# Patient Record
Sex: FEMALE | Race: WHITE | NOT HISPANIC OR LATINO | Employment: UNEMPLOYED | URBAN - METROPOLITAN AREA
[De-identification: names, ages, dates, MRNs, and addresses within clinical notes are randomized per-mention and may not be internally consistent; named-entity substitution may affect disease eponyms.]

---

## 2018-01-02 ENCOUNTER — CONVERSION ENCOUNTER (OUTPATIENT)
Dept: MAMMOGRAPHY | Facility: HOSPITAL | Age: 48
End: 2018-01-02

## 2018-04-26 ENCOUNTER — OFFICE VISIT CONVERTED (OUTPATIENT)
Dept: FAMILY MEDICINE CLINIC | Facility: CLINIC | Age: 48
End: 2018-04-26
Attending: NURSE PRACTITIONER

## 2018-05-29 ENCOUNTER — OFFICE VISIT CONVERTED (OUTPATIENT)
Dept: FAMILY MEDICINE CLINIC | Facility: CLINIC | Age: 48
End: 2018-05-29
Attending: NURSE PRACTITIONER

## 2018-05-29 ENCOUNTER — CONVERSION ENCOUNTER (OUTPATIENT)
Dept: FAMILY MEDICINE CLINIC | Facility: CLINIC | Age: 48
End: 2018-05-29

## 2018-06-27 ENCOUNTER — CONVERSION ENCOUNTER (OUTPATIENT)
Dept: FAMILY MEDICINE CLINIC | Facility: CLINIC | Age: 48
End: 2018-06-27

## 2018-06-27 ENCOUNTER — OFFICE VISIT CONVERTED (OUTPATIENT)
Dept: FAMILY MEDICINE CLINIC | Facility: CLINIC | Age: 48
End: 2018-06-27
Attending: NURSE PRACTITIONER

## 2018-10-24 ENCOUNTER — OFFICE VISIT CONVERTED (OUTPATIENT)
Dept: FAMILY MEDICINE CLINIC | Facility: CLINIC | Age: 48
End: 2018-10-24
Attending: NURSE PRACTITIONER

## 2018-10-24 ENCOUNTER — CONVERSION ENCOUNTER (OUTPATIENT)
Dept: FAMILY MEDICINE CLINIC | Facility: CLINIC | Age: 48
End: 2018-10-24

## 2019-01-09 ENCOUNTER — HOSPITAL ENCOUNTER (OUTPATIENT)
Dept: OTHER | Facility: HOSPITAL | Age: 49
Discharge: HOME | End: 2019-01-09
Attending: NURSE PRACTITIONER

## 2019-02-04 ENCOUNTER — OFFICE VISIT CONVERTED (OUTPATIENT)
Dept: FAMILY MEDICINE CLINIC | Facility: CLINIC | Age: 49
End: 2019-02-04
Attending: FAMILY MEDICINE

## 2019-02-04 ENCOUNTER — HOSPITAL ENCOUNTER (OUTPATIENT)
Dept: FAMILY MEDICINE CLINIC | Facility: CLINIC | Age: 49
Discharge: HOME | End: 2019-02-04
Attending: FAMILY MEDICINE

## 2019-02-07 LAB — BACTERIA SPEC AEROBE CULT: NORMAL

## 2019-05-08 ENCOUNTER — HOSPITAL ENCOUNTER (OUTPATIENT)
Dept: FAMILY MEDICINE CLINIC | Facility: CLINIC | Age: 49
Discharge: HOME | End: 2019-05-08
Attending: NURSE PRACTITIONER

## 2019-05-08 ENCOUNTER — OFFICE VISIT CONVERTED (OUTPATIENT)
Dept: FAMILY MEDICINE CLINIC | Facility: CLINIC | Age: 49
End: 2019-05-08
Attending: NURSE PRACTITIONER

## 2019-05-08 LAB
25(OH)D3 SERPL-MCNC: 23.3 NG/ML (ref 30–100)
ALBUMIN SERPL-MCNC: 3.8 G/DL (ref 3.5–5)
ALBUMIN/GLOB SERPL: 1.2 {RATIO} (ref 1.4–2.6)
ALP SERPL-CCNC: 98 U/L (ref 42–98)
ALT SERPL-CCNC: 30 U/L (ref 10–40)
ANION GAP SERPL CALC-SCNC: 14 MMOL/L (ref 8–19)
ASO AB SERPL-ACNC: 37 [IU]/ML (ref 0–200)
AST SERPL-CCNC: 25 U/L (ref 15–50)
BILIRUB SERPL-MCNC: 0.24 MG/DL (ref 0.2–1.3)
BUN SERPL-MCNC: 13 MG/DL (ref 5–25)
BUN/CREAT SERPL: 14 {RATIO} (ref 6–20)
CALCIUM SERPL-MCNC: 9.4 MG/DL (ref 8.7–10.4)
CHLORIDE SERPL-SCNC: 97 MMOL/L (ref 99–111)
CONV CO2: 27 MMOL/L (ref 22–32)
CONV CREATININE URINE, RANDOM: 60.9 MG/DL (ref 10–300)
CONV MICROALBUM.,U,RANDOM: <12 MG/L (ref 0–20)
CONV TOTAL PROTEIN: 7 G/DL (ref 6.3–8.2)
CREAT UR-MCNC: 0.95 MG/DL (ref 0.5–0.9)
CRP SERPL HS-MCNC: 0.64 MG/DL (ref 0–0.5)
ERYTHROCYTE [SEDIMENTATION RATE] IN BLOOD: 14 MM/H (ref 0–20)
FOLATE SERPL-MCNC: 18.4 NG/ML (ref 4.8–20)
GFR SERPLBLD BASED ON 1.73 SQ M-ARVRAT: >60 ML/MIN/{1.73_M2}
GLOBULIN UR ELPH-MCNC: 3.2 G/DL (ref 2–3.5)
GLUCOSE SERPL-MCNC: 70 MG/DL (ref 65–99)
MICROALBUMIN/CREAT UR: 19.7 MG/G{CRE} (ref 0–35)
OSMOLALITY SERPL CALC.SUM OF ELEC: 279 MOSM/KG (ref 273–304)
POTASSIUM SERPL-SCNC: 3.3 MMOL/L (ref 3.5–5.3)
SODIUM SERPL-SCNC: 135 MMOL/L (ref 135–147)
URATE SERPL-MCNC: 4.3 MG/DL (ref 2.5–7.5)
VIT B12 SERPL-MCNC: 1504 PG/ML (ref 211–911)

## 2019-05-09 LAB
CHOLEST SERPL-MCNC: 141 MG/DL (ref 107–200)
CHOLEST/HDLC SERPL: 3.8 {RATIO} (ref 3–6)
CONV ANTI NUCLEAR ANTIBODY WITH REFLEX: NEGATIVE
HDLC SERPL-MCNC: 37 MG/DL (ref 40–60)
LDLC SERPL CALC-MCNC: 61 MG/DL (ref 70–100)
TRIGL SERPL-MCNC: 213 MG/DL (ref 40–150)
VLDLC SERPL-MCNC: 43 MG/DL (ref 5–37)

## 2019-07-12 ENCOUNTER — HOSPITAL ENCOUNTER (OUTPATIENT)
Dept: FAMILY MEDICINE CLINIC | Facility: CLINIC | Age: 49
Discharge: HOME | End: 2019-07-12
Attending: NURSE PRACTITIONER

## 2019-07-12 ENCOUNTER — OFFICE VISIT CONVERTED (OUTPATIENT)
Dept: FAMILY MEDICINE CLINIC | Facility: CLINIC | Age: 49
End: 2019-07-12
Attending: NURSE PRACTITIONER

## 2019-10-21 ENCOUNTER — HOSPITAL ENCOUNTER (OUTPATIENT)
Dept: FAMILY MEDICINE CLINIC | Facility: CLINIC | Age: 49
Discharge: HOME | End: 2019-10-21
Attending: NURSE PRACTITIONER

## 2019-10-21 ENCOUNTER — CONVERSION ENCOUNTER (OUTPATIENT)
Dept: FAMILY MEDICINE CLINIC | Facility: CLINIC | Age: 49
End: 2019-10-21

## 2019-10-21 ENCOUNTER — OFFICE VISIT CONVERTED (OUTPATIENT)
Dept: FAMILY MEDICINE CLINIC | Facility: CLINIC | Age: 49
End: 2019-10-21
Attending: NURSE PRACTITIONER

## 2019-10-21 LAB
ALBUMIN SERPL-MCNC: 4.2 G/DL (ref 3.5–5)
ALBUMIN/GLOB SERPL: 1.4 {RATIO} (ref 1.4–2.6)
ALP SERPL-CCNC: 80 U/L (ref 42–98)
ALT SERPL-CCNC: 21 U/L (ref 10–40)
ANION GAP SERPL CALC-SCNC: 20 MMOL/L (ref 8–19)
AST SERPL-CCNC: 22 U/L (ref 15–50)
BASOPHILS # BLD AUTO: 0.05 10*3/UL (ref 0–0.2)
BASOPHILS NFR BLD AUTO: 1.4 % (ref 0–3)
BILIRUB SERPL-MCNC: 0.21 MG/DL (ref 0.2–1.3)
BUN SERPL-MCNC: 19 MG/DL (ref 5–25)
BUN/CREAT SERPL: 17 {RATIO} (ref 6–20)
CALCIUM SERPL-MCNC: 9.4 MG/DL (ref 8.7–10.4)
CHLORIDE SERPL-SCNC: 99 MMOL/L (ref 99–111)
CHOLEST SERPL-MCNC: 159 MG/DL (ref 107–200)
CHOLEST/HDLC SERPL: 4.3 {RATIO} (ref 3–6)
CONV ABS IMM GRAN: 0.02 10*3/UL (ref 0–0.2)
CONV CO2: 23 MMOL/L (ref 22–32)
CONV IMMATURE GRAN: 0.6 % (ref 0–1.8)
CONV TOTAL PROTEIN: 7.2 G/DL (ref 6.3–8.2)
CREAT UR-MCNC: 1.09 MG/DL (ref 0.5–0.9)
DEPRECATED RDW RBC AUTO: 42.7 FL (ref 36.4–46.3)
EOSINOPHIL # BLD AUTO: 0.13 10*3/UL (ref 0–0.7)
EOSINOPHIL # BLD AUTO: 3.6 % (ref 0–7)
ERYTHROCYTE [DISTWIDTH] IN BLOOD BY AUTOMATED COUNT: 13 % (ref 11.7–14.4)
GFR SERPLBLD BASED ON 1.73 SQ M-ARVRAT: 60 ML/MIN/{1.73_M2}
GLOBULIN UR ELPH-MCNC: 3 G/DL (ref 2–3.5)
GLUCOSE SERPL-MCNC: 87 MG/DL (ref 65–99)
HCT VFR BLD AUTO: 44.6 % (ref 37–47)
HDLC SERPL-MCNC: 37 MG/DL (ref 40–60)
HGB BLD-MCNC: 15 G/DL (ref 12–16)
LDLC SERPL CALC-MCNC: 79 MG/DL (ref 70–100)
LYMPHOCYTES # BLD AUTO: 1.27 10*3/UL (ref 1–5)
LYMPHOCYTES NFR BLD AUTO: 35.3 % (ref 20–45)
MCH RBC QN AUTO: 30.3 PG (ref 27–31)
MCHC RBC AUTO-ENTMCNC: 33.6 G/DL (ref 33–37)
MCV RBC AUTO: 90.1 FL (ref 81–99)
MONOCYTES # BLD AUTO: 0.24 10*3/UL (ref 0.2–1.2)
MONOCYTES NFR BLD AUTO: 6.7 % (ref 3–10)
NEUTROPHILS # BLD AUTO: 1.89 10*3/UL (ref 2–8)
NEUTROPHILS NFR BLD AUTO: 52.4 % (ref 30–85)
NRBC CBCN: 0 % (ref 0–0.7)
OSMOLALITY SERPL CALC.SUM OF ELEC: 288 MOSM/KG (ref 273–304)
PLATELET # BLD AUTO: 209 10*3/UL (ref 130–400)
PMV BLD AUTO: 11.4 FL (ref 9.4–12.3)
POTASSIUM SERPL-SCNC: 3.7 MMOL/L (ref 3.5–5.3)
RBC # BLD AUTO: 4.95 10*6/UL (ref 4.2–5.4)
SODIUM SERPL-SCNC: 138 MMOL/L (ref 135–147)
TRIGL SERPL-MCNC: 214 MG/DL (ref 40–150)
VLDLC SERPL-MCNC: 43 MG/DL (ref 5–37)
WBC # BLD AUTO: 3.6 10*3/UL (ref 4.8–10.8)

## 2019-10-22 LAB
ASO AB SERPL-ACNC: 43 [IU]/ML (ref 0–200)
CONV RHEUMATOID FACTOR IGM: <10 [IU]/ML (ref 0–14)
CRP SERPL-MCNC: 2.4 MG/L (ref 0–5)
DSDNA AB SER-ACNC: NEGATIVE [IU]/ML
ENA AB SER IA-ACNC: NEGATIVE {RATIO}
URATE SERPL-MCNC: 4 MG/DL (ref 2.5–7.5)

## 2019-11-11 ENCOUNTER — OFFICE VISIT CONVERTED (OUTPATIENT)
Dept: ORTHOPEDIC SURGERY | Facility: CLINIC | Age: 49
End: 2019-11-11
Attending: ORTHOPAEDIC SURGERY

## 2019-11-11 ENCOUNTER — CONVERSION ENCOUNTER (OUTPATIENT)
Dept: ORTHOPEDIC SURGERY | Facility: CLINIC | Age: 49
End: 2019-11-11

## 2020-06-03 ENCOUNTER — OFFICE VISIT CONVERTED (OUTPATIENT)
Dept: ORTHOPEDIC SURGERY | Facility: CLINIC | Age: 50
End: 2020-06-03
Attending: PHYSICIAN ASSISTANT

## 2021-01-13 ENCOUNTER — OFFICE VISIT CONVERTED (OUTPATIENT)
Dept: ORTHOPEDIC SURGERY | Facility: CLINIC | Age: 51
End: 2021-01-13
Attending: PHYSICIAN ASSISTANT

## 2021-02-03 ENCOUNTER — OFFICE VISIT CONVERTED (OUTPATIENT)
Dept: ORTHOPEDIC SURGERY | Facility: CLINIC | Age: 51
End: 2021-02-03
Attending: PHYSICIAN ASSISTANT

## 2021-05-07 NOTE — PROGRESS NOTES
Progress Note      Patient Name: Nani Shultz   Patient ID: [id number]   Sex: Female   YOB: 1970        Visit Date: April 26, 2018    Provider: CHIARA White   Location: [facility]   Location Address: [address] [place] [address]  [id number]   Location Phone: [phone]          Chief Complaint     PATIENT IS HERE FRO HIGH BLOOD PRESSURE, SHE HAS HAD ABLATION DONE AND SHE GOT STUNG ON HER LEFT BEFORE ARM ABOUT A WEEK A GO AND IT IS STILL NOT HEALED UP YET.  SHE HAS BEEN A LITTLE UNHAPPY WITH OUR FRONT OFFICE AS FAR AS TRYING TO GET IN TO SEE SOMEONE.  I AM GOING TO LET EDITH SPEAK WITH HER.  SHE DID GET HER FLU SHOT, SHE HAD MAMMOGRAM NOT TO LONG A GO, SHE JUST HAD A PAP SMEAR.       History Of Present Illness  Nani Shultz is a 47 year old /White female who presents for evaluation and treatment of:      PATIENT IS HERE For HIGH BLOOD PRESSURE, and s/s of the dizziness and ringing of ear right side and fatigue and then HA's--the HA x 2 days --BP been elevated at the /90 on Monday then today 144/84--then states sister, mother, and GM all have HTN--pt states at least couple months now--BP been elevated and states feels swollen     SHE HAS HAD ABLATION DONE--2 weeks ago--4/13/18--and F/U with DR Shepard--     AND SHE GOT STUNG by a wasp--happened last Tuesday and the whole arm was covered in whelps and rash and hives--and itchy--did cold compresses and then witch hazel and then took benadryl and then also some sort of breathing --like fullness and bloated feeling--and pt didn't know if was from the surgery or the wasp sting--took 5 days for it to go away-and still bruised and a scab still today--all ON HER LEFT BEFORE ARM ABOUT A WEEK A GO AND IT IS STILL NOT HEALED UP YET.     just had Tdap 2 yrs ago when grandbaby was born     and wt gain--and over the last 4 yrs and was always size 4 all her life until now--and slow wt gain--average wt  120# and then states never weighed this wt --and all in her gut and butt--and cut out pepsi's and then replaces with water with crystal light lemonade and then salads--doing this for months and exercise bike 3 miles 3 times weekly--and pt never lost anything--and states all her sisters are big but she's never been big--not eating sweets--her sisters have thyroid DO--    SHE DID GET HER FLU SHOT,     SHE HAD MAMMOGRAM NOT TO LONG AGO,     SHE JUST HAD A PAP SMEAR.    and refills and labs       Past Medical History  Disease Name Date Onset Notes   Anxiety --  --    CVA (cerebral vascular accident); history --  --    Heart attack --  --    Hyperlipidemia --  --    Hypertension, Benign Essential --  --    Seasonal allergies --  --          Past Surgical History  Procedure Name Date Notes   Tubal ligation --  --          Medication List  Name Date Started Instructions   baclofen 10 mg oral tablet  --    fluticasone propionate (bulk) 100 % miscellaneous powder  use as directed   levocetirizine 5 mg oral tablet  --    Lumigan 0.01 % ophthalmic (eye) drops  --    meloxicam 15 mg oral tablet  take 1 tablet (15 mg) by oral route once daily   ondansetron 4 mg oral tablet,disintegrating  --    pravastatin 40 mg oral tablet  --          Allergy List  Allergen Name Date Reaction Notes   Codeine Sulfate --  --  --          Family Medical History  Disease Name Relative/Age Notes   Alcohol Abuse / Father    Father/    Arthrtis / Mother    Mother/    Chronic Obstructive Pulmonary Disease / Grandmother (maternal)    Grandmother (maternal)/    Father, Grandfather, or Brother developed Heart Disease before the age of 65 / --    Hyperlipidemia / Father; Grandmother (paternal); Brother; Sister    Brother/     Father/     Grandmother (paternal)/     Sister/    Hypertension / Mother; Grandmother (maternal); Grandfather (maternal); Grandmother (paternal); Brother; Sister    Brother/     Grandfather (maternal)/     Grandmother (maternal)/      "Grandmother (paternal)/     Mother/     Sister/    Mother, Grandmother, or Sister developed Heart Disease before the age of 65 / --    Osteoporosis / Mother; Grandmother (maternal)    Grandmother (maternal)/     Mother/          Social History  Finding Status Start/Stop Quantity Notes   Alcohol Never --/-- --  never drinks alcohol   Exercises regularly --  --/-- --  3-4 times per week   Recreational Drug Use Never --/-- --  never used   Second hand smoke exposure Unknown --/-- --  no   Tobacco Former --/-- --  former smoker, for 15 years, currently uses other tobacco products   Uses seatbelts --  --/-- --  yes         Review of Systems  · Constitutional  o Admits  o : fatigue, headache, weight gain  o Denies  o : fever  · HENT  o Admits  o : vertigo  · Cardiovascular  o Admits  o : lightheadedness  o Denies  o : chest pain, irregular heart beats, rapid heart rate, lower extremity edema  · Respiratory  o Denies  o : shortness of breath, productive cough  · Gastrointestinal  o Admits  o : nausea  o Denies  o : vomiting  · Genitourinary  o Denies  o : dysuria, urinary retention  · Integument  o Admits  o : rash, itching, additional integumentary symptoms except as noted in the HPI  · Neurologic  o Admits  o : headaches, dizziness  · Musculoskeletal  o Denies  o : joint swelling, limitation of motion  · Endocrine  o Denies  o : cold intolerance, heat intolerance  · Psychiatric  o Denies  o : anxiety, depression, suicidal ideation, homicidal ideation  · Heme-Lymph  o Denies  o : petechiae, lymph node enlargement or tenderness  · Allergic-Immunologic  o Denies  o : frequent illnesses      Vitals  Date Time BP Position Site L\R Cuff Size HR RR TEMP(F) WT  HT  BMI kg/m2 BSA m2 O2 Sat HC       04/26/2018 12:53 /84 Sitting    110 - R  98 156lbs 3oz 5'  3\" 27.67 1.77 98 %           Physical Examination  · Constitutional  o Appearance  o : well developed, well-nourished, in no acute distress--BMI 27  · Head and " Face  o HEENT  o : Unremarkable  · Eyes  o Conjunctivae  o : conjunctivae normal  · Neck  o Inspection/Palpation  o : supple  o Thyroid  o : no thyromegaly  · Respiratory  o Respiratory Effort  o : breathing unlabored  o Auscultation of Lungs  o : clear to ascultation  · Cardiovascular  o Heart  o :   § Auscultation of Heart  § : regular rate and rhythm  o Peripheral Vascular System  o :   § Extremities  § : no pitting edema  · Gastrointestinal  o Abdominal Examination  o :   § Abdomen  § : soft nontender small puncture site areas well approximated.   · Lymphatic  o Neck  o : no lymphadenopathy present  · Musculoskeletal  o General  o :   § General Musculoskeletal  § : No joint swelling or deformity., Muscle tone, strength, and development grossly normal.  · Skin and Subcutaneous Tissue  o General Inspection  o : skin turgor normal, texture normal--the left inner FA with a scabbed area from the wasp sting and then bruising around the area--  · Neurologic  o Gait and Station  o :   § Gait Screening  § : normal gait  · Psychiatric  o Mood and Affect  o : mood normal, affect appropriate              Assessment  · Allergic rhinitis due to allergen     477.9/J30.9  · Fatigue     780.79/R53.83  · Hyperlipidemia     272.4/E78.5  · Hypertension     401.9/I10  · Wasp sting       Toxic effect of venom of wasps, accidental (unintentional), initial encounter     989.5/T63.461A  · Allergic reaction to insect sting       Toxic effect of venom of other arthropod, accidental (unintentional), initial encounter     989.5/T63.481A      Plan  · Orders  o B12 Folate levels (B12FO) - 780.79/R53.83 - 04/26/2018  o CBC with Auto Diff [facility] (26230) - 401.9/I10, 989.5/T63.461A - 04/26/2018  o CMP [facility] (78710) - 401.9/I10 - 04/26/2018  o Lipid Panel [facility] (54320) - 272.4/E78.5 - 04/26/2018  o Thyroid Profile (THYII) - 401.9/I10 - 04/26/2018  o ACO-39: Current medications updated and reviewed () - - 04/26/2018  o ACO-18: Positive screen for  clinical depression using a standardized tool and a follow-up plan documented () - - 04/26/2018  o Influenza immunization was ordered or administered () - - 04/26/2018  o EKG (87759) - 401.9/I10 - 04/26/2018  · Medications  o Flonase Allergy Relief 50 mcg/actuation nasal spray,suspension   SIG: spray 1 - 2 sprays (50 - 100 mcg) in each nostril by intranasal route once daily as needed   DISP: (1) 9.9 ml aer w/adap with 5 refills  Prescribed on 04/26/2018     o EpiPen 2-Pritesh 0.3 mg/0.3 mL injection auto-injector   SIG: inject 0.3 milliliter (0.3 mg) by intramuscular route once as needed for anaphylaxis and go to ER dept if used   DISP: (2) 2 ct syringes with 1 refills  Prescribed on 04/26/2018     o hydrochlorothiazide 12.5 mg oral tablet   SIG: take 1 tablet (12.5 mg) by oral route once daily   DISP: (30) tablets with 1 refills  Prescribed on 04/26/2018     o levocetirizine 5 mg oral tablet   SIG: take 1 tablet (5 mg) by oral route once daily in the evening   DISP: (30) tablet with 5 refills  Adjusted on 04/26/2018     o meloxicam 15 mg oral tablet   SIG: take 1 tablet (15 mg) by oral route once daily   DISP: (30) tablet with 5 refills  Adjusted on 04/26/2018     o pravastatin 40 mg oral tablet   SIG: take 1 tablet (40 mg) by oral route once daily at bedtime   DISP: (30) tablet with 5 refills  Adjusted on 04/26/2018     o baclofen 10 mg oral tablet   SIG: ---   DISP: (0) tablet with 0 refills  Discontinued on 04/26/2018     o fluticasone propionate (bulk) 100 % miscellaneous powder   SIG: use as directed   DISP: (1) 0.5 gm jar with 0 refills  Discontinued on 04/26/2018     o ondansetron 4 mg oral tablet,disintegrating   SIG: ---   DISP: (0) tablet with 0 refills  Discontinued on 04/26/2018     · Instructions  o Recommended exercise program to assist with cholesterol, weight loss and overall health improvement.  o Advised that cheeses and other sources of dairy fats, animal fats, fast food, and the extras  (candy, pasteries, pies, doughnuts and cookies) all contain LDL raising nutrients. Advised to increase fruits, vegetables, whole grains, and to monitor portion sizes.   o Take all medications as prescribed/directed.  o Patient was educated/instructed on their diagnosis, treatment and medications prior to discharge from the clinic today.  o Patient instructed to seek medical attention urgently for new or worsening symptoms.  o Call the office with any concerns or questions.  o Chronic conditions reviewed and taken into consideration for today's treatment plan.  · Disposition  o Call or Return if symptoms worsen or persist.  o Return Visit Request in/on 1 month +/- 2 days (5810).            Electronically Signed by: CHIARA White -Author on April 26, 2018 11:35:19 PM

## 2021-05-07 NOTE — PROGRESS NOTES
Progress Note      Patient Name: Nani Shultz   Patient ID: [id number]   Sex: Female   YOB: 1970        Visit Date: July 12, 2019    Provider: RODOLFO Braswell   Location: [facility]   Location Address: [address] [place] [address] [place]  [id number]   Location Phone: [phone]          Chief Complaint     right elbow pain, 2-3 weeks, getting worse.       History Of Present Illness  Nani Shultz is a 48 year old /White female who presents for evaluation and treatment of:      right elbow pain x 2-3 weeks and worsening - pain is worse now that it has been - last night she kept waking with shooting pain - she went to Urgent Care and was dx with tennis elbow 2.5 weeks and was given Naproxen, with very little improvement - using Biofreeze and a medicated patch with very little relief - she was painting a ceiling originally and pain started - having some numbness of the hand that started today and having some swelling of the right hand       Past Medical History  Disease Name Date Onset Notes   Anxiety --  --    CVA (cerebral vascular accident); history --  --    Heart attack --  --    Hyperlipidemia --  --    Hypertension, Benign Essential --  --    Seasonal allergies --  --          Past Surgical History  Procedure Name Date Notes   Tubal ligation --  --          Medication List  Name Date Started Instructions   EpiPen 2-Pritesh 0.3 mg/0.3 mL injection auto-injector 05/08/2019 inject 0.3 milliliter (0.3 mg) by intramuscular route once as needed for anaphylaxis and go to ER dept if used   Flonase Allergy Relief 50 mcg/actuation nasal spray,suspension 05/08/2019 spray 1 - 2 sprays (50 - 100 mcg) in each nostril by intranasal route once daily as needed for 30 days   hydrochlorothiazide 25 mg oral tablet 05/08/2019 take 1 tablet (25 mg) by oral route once daily for 30 days   levocetirizine 5 mg oral tablet 05/08/2019 take 1 tablet (5 mg) by oral route once daily in the  evening for 30 days   Lumigan 0.01 % ophthalmic (eye) drops  --    meloxicam 15 mg oral tablet 05/08/2019 take 1 tablet (15 mg) by oral route once daily for 30 days   naproxen 500 mg oral tablet  take 1 tablet (500 mg) by oral route 2 times per day with food   pravastatin 40 mg oral tablet 05/08/2019 take 1 tablet (40 mg) by oral route once daily at bedtime   Vitamin D2 50,000 unit oral capsule 06/24/2019 take 1 capsule (50,000 unit) by oral route once weekly for 30 days   Wellbutrin  mg oral tablet extended release 24 hr 05/08/2019 take 3 tablets by oral route daily         Allergy List  Allergen Name Date Reaction Notes   Codeine Sulfate --  --  --          Family Medical History  Disease Name Relative/Age Notes   Chronic Obstructive Pulmonary Disease Grandmother (maternal)/   Grandmother (maternal)   Hyperlipidemia Brother/  Father/  Grandmother (paternal)/  Sister/   Father; Grandmother (paternal); Brother; Sister   Hypertension Brother/  Grandfather (maternal)/  Grandmother (maternal)/  Grandmother (paternal)/  Mother/  Sister/   Mother; Grandmother (maternal); Grandfather (maternal); Grandmother (paternal); Brother; Sister   Arthrtis Mother/   Mother   Osteoporosis Grandmother (maternal)/  Mother/   Mother; Grandmother (maternal)   Alcohol Abuse Father/   Father   Mother, Grandmother, or Sister developed Heart Disease before the age of 65  --    Father, Grandfather, or Brother developed Heart Disease before the age of 65  --          Social History  Finding Status Start/Stop Quantity Notes   Alcohol Never --/-- --  never drinks alcohol   Exercises regularly --  --/-- --  3-4 times per week   Recreational Drug Use Never --/-- --  never used   Second hand smoke exposure Unknown --/-- --  no   Tobacco Former --/-- --  former smoker, for 15 years, currently uses other tobacco products   Uses seatbelts --  --/-- --  yes         Immunizations  NameDate Admin Mfg Trade Name Lot Number Route Inj VIS Given VIS  Publication   HepA10/29/2018 NE HAVRIX-ADULT  NE NE 10/30/2018    Comments:          Review of Systems  · Constitutional  o Admits  o : headache  o Denies  o : fever, chills, body aches  · Cardiovascular  o Denies  o : chest pain, palpitations  · Respiratory  o Denies  o : shortness of breath  · Musculoskeletal  o * See HPI      Vitals  Date Time BP Position Site L\R Cuff Size HR RR TEMP (F) WT  HT  BMI kg/m2 BSA m2 O2 Sat HC       07/12/2019 01:50 /88 Sitting    86 - R  97 151lbs 2oz    98 %          Physical Examination  · Constitutional  o Appearance  o : well developed, well-nourished, in no acute distress  · Eyes  o Conjunctivae  o : conjunctivae normal  o Pupils and Irises  o : pupils equal and round, pupils reactive to light bilaterally  · Neck  o Inspection/Palpation  o : supple  o Thyroid  o : no thyromegaly  · Respiratory  o Respiratory Effort  o : breathing unlabored  o Auscultation of Lungs  o : clear to ascultation  · Cardiovascular  o Heart  o :   § Auscultation of Heart  § : regular rate and rhythm  o Peripheral Vascular System  o :   § Extremities  § : no edema  · Lymphatic  o Neck  o : no lymphadenopathy present  · Musculoskeletal  o General  o :   § General Musculoskeletal  § : tenderness of the right lateral epicondyle with palpation and internal and external rotation of right arm. No joint swelling or deformity. Muscle tone, strength, and development grossly normal.  · Skin and Subcutaneous Tissue  o General Inspection  o : NL tone  · Neurologic  o Gait and Station  o :   § Gait Screening  § : normal gait  · Psychiatric  o Mood and Affect  o : mood normal, affect appropriate              Assessment  · Lateral epicondylitis (tennis elbow)     726.32/M77.10  · Elbow pain, right     719.42/M25.521      Plan  · Orders  o ACO-39: Current medications updated and reviewed () - - 07/12/2019  o Elbow (Right) 3v [facility] (13413-KA) - 719.42/M25.521 -  07/12/2019  · Medications  o prednisone 5 mg oral tablets,dose pack   SIG: take as directed for 6 days   DISP: (1) 21 ct dose-pack with 0 refills  Prescribed on 07/12/2019     · Instructions  o Take all medications as prescribed/directed.  o Patient was educated/instructed on their diagnosis, treatment and medications prior to discharge from the clinic today.  o May take Tylenol and use ice to the elbow - may continue to wear brace.   · Disposition  o Follow up as needed.            Electronically Signed by: RODOLFO Braswell -Author on July 12, 2019 02:50:50 PM
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
## 2021-05-07 NOTE — PROGRESS NOTES
Progress Note      Patient Name: Nani Shultz   Patient ID: [id number]   Sex: Female   YOB: 1970        Visit Date: October 21, 2019    Provider: RODOLFO Braswell   Location: [facility]   Location Address: [address] [place] [address] [place]  [address]   Location Phone: [phone]          Chief Complaint     employee wellness and refills, patient is fasting, and the meloxicam is not working       History Of Present Illness  Nani Shultz is a 48 year old /White female who presents for evaluation and treatment of:      employee wellness with fasting labs    mammogram: last Jan 2019: NL result    Hx of fallopian removal and ablation 1.5years ago - last pap smear 2/2019 by Dr. Dillard     stays home - she previously ran/worked in restaurants and bars and states that she can no longer due to pain in her wrists and elbows - arthritis medication is no longer helping at all, continues to have swelling and increased aching - picking some stuff up will send a shooting pain in her left wrist and elbow and will have to catch with her right hand    refills of medications for     HTN: stable in office -     Hyperlipidemia: no SE of the medication    Arthritis: see above, medication no longer helping    Anxiety: symptoms are very well controlled with Wellbutrin - no SE of medication    Meloxicam isn't helping with tennis elbow       Past Medical History  Disease Name Date Onset Notes   Anxiety --  --    Essential hypertension 10/21/2019 --    Heat stroke --  --    Hyperlipidemia --  --    Hypertension, Benign Essential --  --    Seasonal allergies --  --          Past Surgical History  Procedure Name Date Notes   Tubal ligation --  --          Medication List  Name Date Started Instructions   EpiPen 2-Pritesh 0.3 mg/0.3 mL injection auto-injector 05/08/2019 inject 0.3 milliliter (0.3 mg) by intramuscular route once as needed for anaphylaxis and go to ER dept if used   Flonase  Allergy Relief 50 mcg/actuation nasal spray,suspension 10/21/2019 spray 1 - 2 sprays (50 - 100 mcg) in each nostril by intranasal route once daily as needed for 30 days   hydrochlorothiazide 25 mg oral tablet 10/21/2019 take 1 tablet (25 mg) by oral route once daily for 30 days   levocetirizine 5 mg oral tablet 10/21/2019 take 1 tablet (5 mg) by oral route once daily in the evening for 30 days   Lumigan 0.01 % ophthalmic (eye) drops  --    naproxen 500 mg oral tablet  take 1 tablet (500 mg) by oral route 2 times per day with food   pravastatin 40 mg oral tablet 10/21/2019 take 1 tablet (40 mg) by oral route once daily at bedtime   Vitamin D2 50,000 unit oral capsule 06/24/2019 take 1 capsule (50,000 unit) by oral route once weekly for 30 days   Wellbutrin  mg oral tablet extended release 24 hr 10/21/2019 take 3 tablets by oral route daily         Allergy List  Allergen Name Date Reaction Notes   Codeine Sulfate --  --  --          Family Medical History  Disease Name Relative/Age Notes   Chronic Obstructive Pulmonary Disease Grandmother (maternal)/   Grandmother (maternal)   Hyperlipidemia Brother/  Father/  Grandmother (paternal)/  Sister/   Father; Grandmother (paternal); Brother; Sister   Hypertension Brother/  Grandfather (maternal)/  Grandmother (maternal)/  Grandmother (paternal)/  Mother/  Sister/   Mother; Grandmother (maternal); Grandfather (maternal); Grandmother (paternal); Brother; Sister   Arthrtis Mother/   Mother   Osteoporosis Grandmother (maternal)/  Mother/   Mother; Grandmother (maternal)   Alcohol Abuse Father/   Father   Mother, Grandmother, or Sister developed Heart Disease before the age of 65  --    Father, Grandfather, or Brother developed Heart Disease before the age of 65  --          Social History  Finding Status Start/Stop Quantity Notes   Alcohol Never --/-- --  never drinks alcohol   Exercises regularly --  --/-- --  3-4 times per week   Recreational Drug Use Never --/-- --   "never used   Second hand smoke exposure Unknown --/-- --  no   Tobacco Former --/-- --  former smoker, for 15 years, currently uses other tobacco products   Uses seatbelts --  --/-- --  yes         Immunizations  NameDate Admin Mfg Trade Name Lot Number Route Inj VIS Given VIS Publication   HepA10/29/2018 NE HAVRIX-ADULT  NE NE 10/30/2018    Comments:          Review of Systems  · Constitutional  o Denies  o : headache  · Cardiovascular  o Denies  o : chest pain, shortness of breath, palpitations  · Respiratory  o Admits  o : cough  o Denies  o : shortness of breath, wheezing  · Allergic-Immunologic  o Admits  o : sinus allergy symptoms      Vitals  Date Time BP Position Site L\R Cuff Size HR RR TEMP (F) WT  HT  BMI kg/m2 BSA m2 O2 Sat HC       10/21/2019 08:19 /72 Sitting    77 - R  97.4 145lbs 2oz 5'  3\" 25.71 1.71 97 %          Physical Examination  · Constitutional  o Appearance  o : well developed, well-nourished, in no acute distress  · Head and Face  o HEENT  o : Unremarkable  · Eyes  o Conjunctivae  o : conjunctivae normal  o Pupils and Irises  o : pupils equal and round, pupils reactive to light bilaterally  · Neck  o Inspection/Palpation  o : supple  o Thyroid  o : no thyromegaly  · Respiratory  o Respiratory Effort  o : breathing unlabored  o Auscultation of Lungs  o : clear to ascultation  · Cardiovascular  o Heart  o :   § Auscultation of Heart  § : regular rate and rhythm  o Peripheral Vascular System  o :   § Extremities  § : no edema  · Lymphatic  o Neck  o : no lymphadenopathy present  · Musculoskeletal  o General  o :   § General Musculoskeletal  § : No joint swelling or deformity. Muscle tone, strength, and development grossly normal.  · Skin and Subcutaneous Tissue  o General Inspection  o : NL tone  · Neurologic  o Gait and Station  o :   § Gait Screening  § : normal gait  · Psychiatric  o Mood and Affect  o : mood normal, affect appropriate              Assessment  · Annual physical " exam     V70.0/Z00.00  · Essential hypertension     401.9/I10  · Hyperlipidemia     272.4/E78.5  · Polyarthralgia     719.49/M25.50  · Vitamin D deficiency     268.9/E55.9  · Anxiety     300.02/F41.1  · Lateral epicondylitis of both elbows       Lateral epicondylitis, right elbow     726.32/M77.11  Lateral epicondylitis, left elbow     726.32/M77.12    Problems Reconciled  Plan  · Orders  o CBC with Auto Diff [facility] ([id number]) - 401.9/I10 - 10/21/2019  o CMP [facility] (15288) - 401.9/I10 - 10/21/2019  o Lipid Panel [facility] (58936) - 272.4/E78.5 - 10/21/2019  o RA Profile 1 (Uric Acid, ASO, RF IgM, CORRINA, CRP) [facility] ([id number], [id number], 65156, 88256, 33177) - 719.49/M25.50 - 10/21/2019  o Vitamin D Level (18968) - 268.9/E55.9 - 10/21/2019  o Urinalysis with Reflex Microscopy if abnormal ([facility]) (74255) - 401.9/I10 - 10/21/2019  o ACO-39: Current medications updated and reviewed () - - 10/21/2019  o ORTHOPEDIC CONSULTATION (ORTHO) - 719.49/M25.50 - 10/21/2019  · Medications  o diclofenac sodium 75 mg oral tablet,delayed release (DR/EC)   SIG: take 1 tablet (75 mg) by oral route 2 times per day for 30 days   DISP: (60) tablets with 5 refills  Prescribed on 10/21/2019     o Flonase Allergy Relief 50 mcg/actuation nasal spray,suspension   SIG: spray 1 - 2 sprays (50 - 100 mcg) in each nostril by intranasal route once daily as needed for 30 days   DISP: (1) 9.9 ml aer w/adap with 5 refills  Adjusted on 10/21/2019     o hydrochlorothiazide 25 mg oral tablet   SIG: take 1 tablet (25 mg) by oral route once daily for 30 days   DISP: (30) tablets with 5 refills  Adjusted on 10/21/2019     o levocetirizine 5 mg oral tablet   SIG: take 1 tablet (5 mg) by oral route once daily in the evening for 30 days   DISP: (30) tablet with 5 refills  Adjusted on 10/21/2019     o pravastatin 40 mg oral tablet   SIG: take 1 tablet (40 mg) by oral route once daily at bedtime   DISP: (30) Tablet with 5 refills  Adjusted on 10/21/2019     o Wellbutrin  mg oral  tablet extended release 24 hr   SIG: take 3 tablets by oral route daily   DISP: (90) tablets with 5 refills  Adjusted on 10/21/2019     o meloxicam 15 mg oral tablet   SIG: take 1 tablet (15 mg) by oral route once daily for 30 days   DISP: (30) tablet with 5 refills  Discontinued on 10/21/2019     o Medications have been Reconciled  o Transition of Care or Provider Policy  · Instructions  o Reviewed health maintenance flowsheet and updated information. Orders were placed and/or patient's response was documented.  o Advised that cheeses and other sources of dairy fats, animal fats, fast food, and the extras (candy, pasteries, pies, doughnuts and cookies) all contain LDL raising nutrients. Advised to increase fruits, vegetables, whole grains, and to monitor portion sizes.   o Take all medications as prescribed/directed.  o Patient was educated/instructed on their diagnosis, treatment and medications prior to discharge from the clinic today.  · Disposition  o Follow up as needed.            Electronically Signed by: RODOLFO Braswell -Author on October 24, 2019 08:25:50 AM

## 2021-05-07 NOTE — PROGRESS NOTES
Progress Note      Patient Name: Nani Shultz   Patient ID: [id number]   Sex: Female   YOB: 1970        Visit Date: February 4, 2019    Provider: Mamadou Ignacio MD   Location: [facility]   Location Address: [address] [place] [address]  [id number]   Location Phone: [phone]          Chief Complaint     sore throat and dry cough x 2 days, grandkids       History Of Present Illness  Nani Shultz is a 48 year old /White female who presents for evaluation and treatment of:      cold symptoms x 2 days- sudden onset- worsening symptoms- otc meds not helping       Past Medical History  Disease Name Date Onset Notes   Anxiety --  --    CVA (cerebral vascular accident); history --  --    Heart attack --  --    Hyperlipidemia --  --    Hypertension, Benign Essential --  --    Seasonal allergies --  --          Past Surgical History  Procedure Name Date Notes   Tubal ligation --  --          Medication List  Name Date Started Instructions   EpiPen 2-Pritesh 0.3 mg/0.3 mL injection auto-injector 04/26/2018 inject 0.3 milliliter (0.3 mg) by intramuscular route once as needed for anaphylaxis and go to ER dept if used   Flonase Allergy Relief 50 mcg/actuation nasal spray,suspension 12/04/2018 spray 1 - 2 sprays (50 - 100 mcg) in each nostril by intranasal route once daily as needed for 30 days   hydrochlorothiazide 25 mg oral tablet 10/24/2018 take 1 tablet (25 mg) by oral route once daily for 30 days   levocetirizine 5 mg oral tablet 10/24/2018 take 1 tablet (5 mg) by oral route once daily in the evening for 30 days   Lumigan 0.01 % ophthalmic (eye) drops  --    meloxicam 15 mg oral tablet 10/24/2018 take 1 tablet (15 mg) by oral route once daily for 30 days   pravastatin 40 mg oral tablet 10/24/2018 take 1 tablet (40 mg) by oral route once daily at bedtime   Wellbutrin  mg oral tablet extended release 24 hr 10/24/2018 take 3 tablets by oral route daily          Allergy List  Allergen Name Date Reaction Notes   Codeine Sulfate --  --  --          Family Medical History  Disease Name Relative/Age Notes   Alcohol Abuse / Father    Father/    Arthrtis / Mother    Mother/    Chronic Obstructive Pulmonary Disease / Grandmother (maternal)    Grandmother (maternal)/    Father, Grandfather, or Brother developed Heart Disease before the age of 65 / --    Hyperlipidemia / Father; Grandmother (paternal); Brother; Sister    Brother/     Father/     Grandmother (paternal)/     Sister/    Hypertension / Mother; Grandmother (maternal); Grandfather (maternal); Grandmother (paternal); Brother; Sister    Brother/     Grandfather (maternal)/     Grandmother (maternal)/     Grandmother (paternal)/     Mother/     Sister/    Mother, Grandmother, or Sister developed Heart Disease before the age of 65 / --    Osteoporosis / Mother; Grandmother (maternal)    Grandmother (maternal)/     Mother/          Social History  Finding Status Start/Stop Quantity Notes   Alcohol Never --/-- --  never drinks alcohol   Exercises regularly --  --/-- --  3-4 times per week   Recreational Drug Use Never --/-- --  never used   Second hand smoke exposure Unknown --/-- --  no   Tobacco Former --/-- --  former smoker, for 15 years, currently uses other tobacco products   Uses seatbelts --  --/-- --  yes         Immunizations  NameDate Admin Mfg Trade Name Lot Number Route Inj VIS Given VIS Publication   HepA10/29/2018 NE Havrix Adult  NE NE 10/30/2018    Comments:          Review of Systems  · Constitutional  o Admits  o : fever  o Denies  o : fatigue  · HENT  o Admits  o : nasal congestion, nasal discharge, sore throat  · Cardiovascular  o Denies  o : chest pain, palpitations  · Respiratory  o Denies  o : shortness of breath, cough  · Gastrointestinal  o Denies  o : nausea, vomiting, diarrhea      Vitals  Date Time BP Position Site L\R Cuff Size HR RR TEMP(F) WT  HT  BMI kg/m2 BSA m2 O2 Sat HC        02/04/2019 06:54 /80 Sitting    71 - R  98.4 150lbs 2oz    95 %           Physical Examination  · Constitutional  o Appearance  o : well developed, well-nourished, in no acute distress  · Ears, Nose, Mouth and Throat  o Ears  o :   § Otoscopic Examination  § : tympanic membrane appearance within normal limits bilaterally  o Throat  o :   § Oropharynx  § : erythema of throat, uvula midline, throat open, no abscesses seen  · Respiratory  o Respiratory Effort  o : breathing unlabored  o Auscultation of Lungs  o : clear to ascultation  · Cardiovascular  o Heart  o :   § Auscultation of Heart  § : regular rate and rhythm  · Musculoskeletal  o General  o :   § General Musculoskeletal  § : No joint swelling or deformity., Muscle tone, strength, and development grossly normal.  · Neurologic  o Mental Status Examination  o :   § Orientation  § : grossly oriented to person, place and time  o Gait and Station  o :   § Gait Screening  § : normal gait          Assessment  · Pharyngitis     462/J02.9      Plan  · Orders  o ACO-39: Current medications updated and reviewed () - - 02/04/2019  o Throat culture (40477) - 462/J02.9 - 02/04/2019  · Medications  o azithromycin 250 mg oral tablet   SIG: take 2 tablets (500 mg) by oral route once daily for 1 day then 1 tablet (250 mg) by oral route once daily for 4 days   DISP: (6) tablets with 0 refills  Prescribed on 02/04/2019     o Medrol (Pritesh) 4 mg oral tablets,dose pack   SIG: take as directed for 5 days   DISP: (1) 21 ct dose-pack with 0 refills  Prescribed on 02/04/2019     · Instructions  o Patient was educated/instructed on their diagnosis, treatment and medications prior to discharge from the clinic today.            Electronically Signed by: Mamadou Ignacio MD -Author on February 4, 2019 07:16:26 PM

## 2021-05-07 NOTE — PROGRESS NOTES
Progress Note      Patient Name: Nani Shultz   Patient ID: [id number]   Sex: Female   YOB: 1970        Visit Date: May 8, 2019    Provider: CHIARA White   Location: [facility]   Location Address: [address] [place] [address]  [id number]   Location Phone: [phone]          Chief Complaint     medication refills       History Of Present Illness  Nani Shultz is a 48 year old /White female who presents for evaluation and treatment of:      pt here for the F/U on the chronic co-morbids and then the refills and labs--    HTN--stable  cholesterol--stable no SE  depression-stable  allergies--stable--and flares up at times   OA stable--but more pain with hands--may need to change and some swelling and 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--and no numbness no tingling      paps at Dr Shepard in [place]       Past Medical History  Disease Name Date Onset Notes   Anxiety --  --    CVA (cerebral vascular accident); history --  --    Heart attack --  --    Hyperlipidemia --  --    Hypertension, Benign Essential --  --    Seasonal allergies --  --          Past Surgical History  Procedure Name Date Notes   Tubal ligation --  --          Medication List  Name Date Started Instructions   EpiPen 2-Pritesh 0.3 mg/0.3 mL injection auto-injector 04/26/2018 inject 0.3 milliliter (0.3 mg) by intramuscular route once as needed for anaphylaxis and go to ER dept if used   Flonase Allergy Relief 50 mcg/actuation nasal spray,suspension 12/04/2018 spray 1 - 2 sprays (50 - 100 mcg) in each nostril by intranasal route once daily as needed for 30 days   hydrochlorothiazide 25 mg oral tablet 10/24/2018 take 1 tablet (25 mg) by oral route once daily for 30 days   levocetirizine 5 mg oral tablet 10/24/2018 take 1 tablet (5 mg) by oral route once daily in the evening for 30 days   Lumigan 0.01 % ophthalmic (eye) drops  --    meloxicam 15 mg oral tablet 10/24/2018 take 1  tablet (15 mg) by oral route once daily for 30 days   pravastatin 40 mg oral tablet 10/24/2018 take 1 tablet (40 mg) by oral route once daily at bedtime   Wellbutrin  mg oral tablet extended release 24 hr 10/24/2018 take 3 tablets by oral route daily         Allergy List  Allergen Name Date Reaction Notes   Codeine Sulfate --  --  --          Family Medical History  Disease Name Relative/Age Notes   Chronic Obstructive Pulmonary Disease Grandmother (maternal)/   Grandmother (maternal)   Hyperlipidemia Brother/  Father/  Grandmother (paternal)/  Sister/   Father; Grandmother (paternal); Brother; Sister   Hypertension Brother/  Grandfather (maternal)/  Grandmother (maternal)/  Grandmother (paternal)/  Mother/  Sister/   Mother; Grandmother (maternal); Grandfather (maternal); Grandmother (paternal); Brother; Sister   Arthrtis Mother/   Mother   Osteoporosis Grandmother (maternal)/  Mother/   Mother; Grandmother (maternal)   Alcohol Abuse Father/   Father   Mother, Grandmother, or Sister developed Heart Disease before the age of 65  --    Father, Grandfather, or Brother developed Heart Disease before the age of 65  --          Social History  Finding Status Start/Stop Quantity Notes   Alcohol Never --/-- --  never drinks alcohol   Exercises regularly --  --/-- --  3-4 times per week   Recreational Drug Use Never --/-- --  never used   Second hand smoke exposure Unknown --/-- --  no   Tobacco Former --/-- --  former smoker, for 15 years, currently uses other tobacco products   Uses seatbelts --  --/-- --  yes         Immunizations  NameDate Admin Mfg Trade Name Lot Number Route Inj VIS Given VIS Publication   HepA10/29/2018 NE HAVRIX-ADULT  NE NE 10/30/2018    Comments:          Review of Systems  · Constitutional  o Admits  o : fatigue  o Denies  o : fever  · HENT  o Denies  o : vertigo, recent head injury  · Cardiovascular  o Denies  o : chest pain, irregular heart beats, rapid heart rate, lower extremity  edema  · Respiratory  o Denies  o : shortness of breath, wheezing, productive cough  · Gastrointestinal  o Denies  o : nausea, vomiting, abdominal pain, blood in stools  · Genitourinary  o Denies  o : dysuria, hematuria  · Integument  o Denies  o : rash, hair growth change, new skin lesions  · Neurologic  o Denies  o : altered mental status, tingling or numbness, seizures, tremors  · Musculoskeletal  o Admits  o : wrist pain, knee pain, additional musculoskeletal symptoms except as noted in the HPI  o Denies  o : joint swelling, limitation of motion  · Endocrine  o Denies  o : cold intolerance, heat intolerance  · Psychiatric  o Denies  o : suicidal ideation, homicidal ideation  · Heme-Lymph  o Denies  o : petechiae, lymph node enlargement or tenderness  · Allergic-Immunologic  o Denies  o : frequent illnesses      Vitals  Date Time BP Position Site L\R Cuff Size HR RR TEMP (F) WT  HT  BMI kg/m2 BSA m2 O2 Sat        05/08/2019 09:56 /74 Sitting    87 - R  97.2 151lbs 1oz    97 %          Physical Examination  · Constitutional  o Appearance  o : well developed, well-nourished, in no acute distress  · Head and Face  o HEENT  o : Unremarkable  · Eyes  o Conjunctivae  o : conjunctivae normal  · Neck  o Inspection/Palpation  o : supple  o Thyroid  o : no thyromegaly  · Respiratory  o Respiratory Effort  o : breathing unlabored  o Auscultation of Lungs  o : clear to ascultation  · Cardiovascular  o Heart  o :   § Auscultation of Heart  § : regular rate and rhythm  o Peripheral Vascular System  o :   § Extremities  § : no edema  · Gastrointestinal  o Abdominal Examination  o :   § Abdomen  § : soft nontender  · Lymphatic  o Neck  o : no lymphadenopathy present  · Musculoskeletal  o General  o :   § General Musculoskeletal  § : No joint swelling or deformity., Muscle tone, strength, and development grossly normal. except the (+) hand swelling of the finger joints and tenderness and then tenderness to the knees bilat  and (+0 Crepitus   · Skin and Subcutaneous Tissue  o General Inspection  o : skin turgor normal, texture normal  · Neurologic  o Gait and Station  o :   § Gait Screening  § : normal gait  · Psychiatric  o Mood and Affect  o : mood normal, affect appropriate          Assessment  · Allergic rhinitis due to allergen     477.9/J30.9  · Essential hypertension     401.9/I10  · Fatigue     780.79/R53.83  · Hyperlipidemia     272.4/E78.5  · Major depressive disorder     296.20/F32.2  · Osteoarthritis     715.90/M19.90  · Polyarthralgia     719.49/M25.50  · Bone pain     733.90/M89.8X9  · Hand swelling     729.81/M79.89  · Hand joint pain     719.44/M25.549      Plan  · Orders  o B12 Folate levels (B12FO) - 780.79/R53.83 - 05/08/2019  o CMP [facility] (25868) - 272.4/E78.5 - 05/08/2019  o Lipid Panel [facility] (14728) - 272.4/E78.5 - 05/08/2019  o CORRINA + rheumatoid factor (89520, 95771) - 719.49/M25.50 - 05/08/2019  o Sed rate (64825) - 719.49/M25.50 - 05/08/2019  o ASO titer (88080) - 719.49/M25.50 - 05/08/2019  o Uric Acid (Serum) (52645) - 719.49/M25.50 - 05/08/2019  o CRP (61092) - 719.49/M25.50 - 05/08/2019  o Urine microalbumin (47323) - 401.9/I10 - 05/08/2019  o Vitamin D (25-Hydroxy) Level (04655) - 296.20/F32.2, 733.90/M89.8X9, 780.79/R53.83 - 05/08/2019  o ACO-39: Current medications updated and reviewed () - - 05/08/2019  · Medications  o EpiPen 2-Pritesh 0.3 mg/0.3 mL injection auto-injector   SIG: inject 0.3 milliliter (0.3 mg) by intramuscular route once as needed for anaphylaxis and go to ER dept if used   DISP: (2) 2 ct syringess with 1 refills  Adjusted on 05/08/2019     o Flonase Allergy Relief 50 mcg/actuation nasal spray,suspension   SIG: spray 1 - 2 sprays (50 - 100 mcg) in each nostril by intranasal route once daily as needed for 30 days   DISP: (1) 9.9 ml aer w/adap with 5 refills  Adjusted on 05/08/2019     o hydrochlorothiazide 25 mg oral tablet   SIG: take 1 tablet (25 mg) by oral route once daily for 30 days  "  DISP: (30) tablets with 5 refills  Adjusted on 05/08/2019     o levocetirizine 5 mg oral tablet   SIG: take 1 tablet (5 mg) by oral route once daily in the evening for 30 days   DISP: (30) tablet with 5 refills  Adjusted on 05/08/2019     o meloxicam 15 mg oral tablet   SIG: take 1 tablet (15 mg) by oral route once daily for 30 days   DISP: (30) tablet with 5 refills  Adjusted on 05/08/2019     o pravastatin 40 mg oral tablet   SIG: take 1 tablet (40 mg) by oral route once daily at bedtime   DISP: (30) Tablet with 5 refills  Adjusted on 05/08/2019     o Wellbutrin  mg oral tablet extended release 24 hr   SIG: take 3 tablets by oral route daily   DISP: (90) tablets with 5 refills  Adjusted on 05/08/2019     · Instructions  o Patient advised to monitor blood pressure (B/P) at home and journal readings. Patient informed that a B/P reading at home of more than 135/85 is considered hypertension. For readings greater [id number]/90 or higher patient is advised to follow up in the office with readings for management. Patient advised to limit sodium intake.  o Recommended exercise program to assist with cholesterol, weight loss and overall health improvement.  o Advised that cheeses and other sources of dairy fats, animal fats, fast food, and the extras (candy, pasteries, pies, doughnuts and cookies) all contain LDL raising nutrients. Advised to increase fruits, vegetables, whole grains, and to monitor portion sizes.   o Patient agrees to a \"No Self Harm\" contract. Patient will either call us, 1, ER, [facility], [facility].  o The patient and I discussed the need for therapy, either with a certified counselor, psychologist, and/or family . If no improvement is noted or worsening of their condition, return to office or ER. But also discussed with patient that if they are non-responsive to the type of medication they may need to see a psychaitrist for further evaluation and " management.   o Patient was given an SSRI/SSNRI medication and warned of possible side effects of the medication including potential for increase risk of sucicidal thoughts and feelings. Patient was instructed that if they begin to exhibit any of these effects they will discontinue the medication immediately and contact our office or the ER ASAP.   o Tylenol may be used as needed every 4-6 hours for pain.  o Take all medications as prescribed/directed.  o Patient was educated/instructed on their diagnosis, treatment and medications prior to discharge from the clinic today.  o Patient instructed to seek medical attention urgently for new or worsening symptoms.  o Call the office with any concerns or questions.  o Chronic conditions reviewed and taken into consideration for today's treatment plan.  · Disposition  o Call or Return if symptoms worsen or persist.  o Return Visit Request in/on 6 months +/- 2 days (6868).            Electronically Signed by: CHIARA White -Author on May 8, 2019 10:20:18 AM
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
## 2021-05-09 VITALS
DIASTOLIC BLOOD PRESSURE: 80 MMHG | WEIGHT: 150.12 LBS | TEMPERATURE: 98.4 F | OXYGEN SATURATION: 95 % | HEART RATE: 71 BPM | SYSTOLIC BLOOD PRESSURE: 138 MMHG

## 2021-05-09 VITALS
BODY MASS INDEX: 27.35 KG/M2 | HEIGHT: 63 IN | OXYGEN SATURATION: 96 % | DIASTOLIC BLOOD PRESSURE: 70 MMHG | SYSTOLIC BLOOD PRESSURE: 122 MMHG | TEMPERATURE: 97.6 F | WEIGHT: 154.37 LBS | HEART RATE: 107 BPM

## 2021-05-09 VITALS
HEART RATE: 110 BPM | DIASTOLIC BLOOD PRESSURE: 84 MMHG | HEIGHT: 63 IN | WEIGHT: 156.19 LBS | BODY MASS INDEX: 27.68 KG/M2 | SYSTOLIC BLOOD PRESSURE: 144 MMHG | OXYGEN SATURATION: 98 % | TEMPERATURE: 98 F

## 2021-05-09 VITALS
TEMPERATURE: 97.4 F | DIASTOLIC BLOOD PRESSURE: 88 MMHG | HEART RATE: 94 BPM | WEIGHT: 154.44 LBS | SYSTOLIC BLOOD PRESSURE: 142 MMHG | OXYGEN SATURATION: 97 %

## 2021-05-09 VITALS
HEART RATE: 86 BPM | DIASTOLIC BLOOD PRESSURE: 88 MMHG | WEIGHT: 151.12 LBS | SYSTOLIC BLOOD PRESSURE: 140 MMHG | OXYGEN SATURATION: 98 % | TEMPERATURE: 97 F

## 2021-05-09 VITALS
TEMPERATURE: 97.4 F | DIASTOLIC BLOOD PRESSURE: 82 MMHG | HEART RATE: 94 BPM | OXYGEN SATURATION: 98 % | BODY MASS INDEX: 26.98 KG/M2 | WEIGHT: 152.25 LBS | HEIGHT: 63 IN | SYSTOLIC BLOOD PRESSURE: 120 MMHG

## 2021-05-09 VITALS
DIASTOLIC BLOOD PRESSURE: 74 MMHG | OXYGEN SATURATION: 97 % | HEART RATE: 87 BPM | SYSTOLIC BLOOD PRESSURE: 130 MMHG | WEIGHT: 151.06 LBS | TEMPERATURE: 97.2 F

## 2021-05-09 VITALS
DIASTOLIC BLOOD PRESSURE: 72 MMHG | BODY MASS INDEX: 25.71 KG/M2 | OXYGEN SATURATION: 97 % | HEART RATE: 77 BPM | HEIGHT: 63 IN | TEMPERATURE: 97.4 F | SYSTOLIC BLOOD PRESSURE: 116 MMHG | WEIGHT: 145.12 LBS

## 2021-05-13 NOTE — PROGRESS NOTES
Progress Note      Patient Name: Nani Shultz   Patient ID: [id number]   Sex: Female   YOB: 1970        Visit Date: Snow 3, 2020    Provider: Elizabeth Cavazos PA-C   Location: [facility]   Location Address: [address] [place] [address]  [id number]   Location Phone: [phone]          Chief Complaint  · Bilateral elbow pain      History Of Present Illness  Nani Shultz is a 49 year old /White female who presents today to [facility].      She is here for bilateral elbow pain. She has been treated for lateral epicondylitis and received injections about 6 months ago. 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       Past Medical History  Arthritis; Hyperlipemia; Hypertension; Seasonal allergies; Stroke         Past Surgical History  Hysterectomy; I have had no surgeries         Medication List  hydrochlorothiazide oral; loratadine oral; pravastatin 40 mg oral tablet; Wellbutrin SR oral         Allergy List  Codeine Phosphate; Codeine Sulfate; Wasp stings       Allergies Reconciled  Family Medical History  Heart Disease; Family history of certain chronic disabling diseases; arthritis; Osteoporosis; Family history of Arthritis         Social History  Alcohol Use (Never); Homemaker.; lives with spouse; .; Recreational Drug Use (Never); Tobacco (Former)         Review of Systems  · Constitutional  o Denies  o : fever, chills, weight loss  · Cardiovascular  o Denies  o : chest pain, shortness of breath  · Gastrointestinal  o Denies  o : liver disease, heartburn, nausea, blood in stools  · Genitourinary  o Denies  o : painful urination, blood in urine  · Integument  o Denies  o : rash, itching  · Neurologic  o Denies  o : headache, weakness, loss of consciousness  · Musculoskeletal  o Admits  o : painful, swollen joints  · Psychiatric  o Denies  o : drug/alcohol addiction, anxiety, depression      Vitals  Date Time BP  "Position Site L\R Cuff Size HR RR TEMP (F) WT  HT  BMI kg/m2 BSA m2 O2 Sat HC       06/03/2020 01:20 PM      101 - R   151lbs 4oz 5'  3\" 26.79 1.75 98 %          Physical Examination  · Constitutional  o Appearance  o : well developed, well-nourished, no obvious deformities present  · Head and Face  o Head  o :   § Inspection  § : normocephalic  o Face  o :   § Inspection  § : no facial lesions  · Eyes  o Conjunctivae  o : conjunctivae normal  o Sclerae  o : sclerae white  · Ears, Nose, Mouth and Throat  o Ears  o :   § External Ears  § : appearance within normal limits  § Hearing  § : intact  o Nose  o :   § External Nose  § : appearance normal  · Neck  o Inspection/Palpation  o : normal appearance  o Range of Motion  o : full range of motion  · Respiratory  o Respiratory Effort  o : breathing unlabored  o Inspection of Chest  o : normal appearance  o Auscultation of Lungs  o : no audible wheezing or rales  · Cardiovascular  o Heart  o : regular rate  · Gastrointestinal  o Abdominal Examination  o : soft and non-tender  · Skin and Subcutaneous Tissue  o General Inspection  o : intact, no rashes  · Psychiatric  o General  o : Alert and oriented x3  o Judgement and Insight  o : judgment and insight intact  o Mood and Affect  o : mood normal, affect appropriate  · Extremities  o Extremities  o : BILATERAL ELBOWS: No skin discoloration, atrophy or swelling. Palpable tenderness over the lateral epicondyle. Full ROM. Stable to varus/valgus stress. Full pronation and supination. Neurovascularly and sensation grossly intact. + pain with resisted wrist/digit extension.   · Injection Note/Aspiration Note  o Site  o : bilateral elbows  o Procedure  o : Procedure: After educating the patient, patient gave consent for procedure. After using Chloraprep, the joint space was injected. The patient tolerated the procedure well.  o Medication  o : 80 mg of DepoMedrol with 1cc of 1% Lidocaine          Assessment  · Lateral " epicondylitis of both elbows       Lateral epicondylitis, right elbow     726.32/M77.11  Lateral epicondylitis, left elbow     726.32/M77.12  · Pain of both elbows       Pain in right elbow     719.42/M25.521  Pain in left elbow     719.42/M25.522      Plan  · Orders  o 2 - Depo-Medrol injection 80mg () - 719.42/M25.521, 719.42/M25.522 - 06/03/2020   Lot [id number] Exp 05 2021 [employer] Administered by ELIZABETH ELIAS PA-C  o 2 - Elbow-Lat Single Tendon (Injection) (27520) - 719.42/M25.521, 719.42/M25.522 - 06/03/2020   Lot [id number] Exp 07 2021 [employer] Administered by ELIZABETH ELIAS PA-C  · Medications  o Medications have been Reconciled  o Transition of Care or Provider Policy  · Instructions  o Reviewed the patient's Past Medical, Social, and Family history as well as the ROS at today's visit, no changes.  o Call or return if worsening symptoms.  o Continue HEP. Voltaren prescribed. Recommended elbow straps. Follow Up PRN.            Electronically Signed by: Elizabeth Elias PA-C -Author on Snow 3, 2020 01:39:32 PM

## 2021-05-14 VITALS — HEART RATE: 76 BPM | HEIGHT: 63 IN | OXYGEN SATURATION: 97 % | WEIGHT: 149 LBS | BODY MASS INDEX: 26.4 KG/M2

## 2021-05-14 VITALS — OXYGEN SATURATION: 99 % | HEIGHT: 63 IN | BODY MASS INDEX: 27.18 KG/M2 | WEIGHT: 153.37 LBS | HEART RATE: 91 BPM

## 2021-05-14 NOTE — PROGRESS NOTES
Progress Note      Patient Name: Nani Shultz   Patient ID: [id number]   Sex: Female   YOB: 1970        Visit Date: February 3, 2021    Provider: Elizabeth Cavazos PA-C   Location: [facility]   Location Address: [address] [place] [address]  [id number]   Location Phone: [phone]          Chief Complaint  · Follow up left knee pain      History Of Present Illness  Nani Shultz is a 50 year old /White female who presents today to [facility].      She is here for follow up left knee pain and bilateral lateral epicondylitis. She states injections helped elbow pain. She states her knee feels much better.       Past Medical History  Arthritis; Hyperlipemia; Hypertension; Seasonal allergies; Stroke         Past Surgical History  Hysterectomy; I have had no surgeries         Medication List  diclofenac sodium 75 mg oral tablet,delayed release (DR/EC); hydrochlorothiazide oral; loratadine oral; pravastatin 40 mg oral tablet; Wellbutrin SR oral         Allergy List  Codeine Phosphate; Codeine Sulfate; Wasp stings       Allergies Reconciled  Family Medical History  Heart Disease; Family history of certain chronic disabling diseases; arthritis; Osteoporosis; Family history of Arthritis         Social History  Alcohol Use (Never); Homemaker.; lives with spouse; .; Recreational Drug Use (Never); Tobacco (Former)         Review of Systems  · Constitutional  o Denies  o : fever, chills, weight loss  · Cardiovascular  o Denies  o : chest pain, shortness of breath  · Gastrointestinal  o Denies  o : liver disease, heartburn, nausea, blood in stools  · Genitourinary  o Denies  o : painful urination, blood in urine  · Integument  o Denies  o : rash, itching  · Neurologic  o Denies  o : headache, weakness, loss of consciousness  · Musculoskeletal  o Admits  o : painful, swollen joints  · Psychiatric  o Denies  o : drug/alcohol addiction, anxiety, depression      Vitals  Date Time BP  "Position Site L\R Cuff Size HR RR TEMP (F) WT  HT  BMI kg/m2 BSA m2 O2 Sat FR L/min FiO2 HC       02/03/2021 10:54 AM      76 - R   149lbs 0oz 5'  3\" 26.39 1.73 97 %            Physical Examination  · Constitutional  o Appearance  o : well developed, well-nourished, no obvious deformities present  · Head and Face  o Head  o :   § Inspection  § : normocephalic  o Face  o :   § Inspection  § : no facial lesions  · Eyes  o Conjunctivae  o : conjunctivae normal  o Sclerae  o : sclerae white  · Ears, Nose, Mouth and Throat  o Ears  o :   § External Ears  § : appearance within normal limits  § Hearing  § : intact  o Nose  o :   § External Nose  § : appearance normal  · Neck  o Inspection/Palpation  o : normal appearance  o Range of Motion  o : full range of motion  · Respiratory  o Respiratory Effort  o : breathing unlabored  o Inspection of Chest  o : normal appearance  o Auscultation of Lungs  o : no audible wheezing or rales  · Cardiovascular  o Heart  o : regular rate  · Gastrointestinal  o Abdominal Examination  o : soft and non-tender  · Skin and Subcutaneous Tissue  o General Inspection  o : intact, no rashes  · Psychiatric  o General  o : Alert and oriented x3  o Judgement and Insight  o : judgment and insight intact  o Mood and Affect  o : mood normal, affect appropriate  · Left Knee  o Inspection  o : No effusion. Mild medial joint line tenderness. Full ROM. No instability with varus/valgus stress. Neurovascularly intact. Negative Lachman's. Negative Thessaly.           Assessment  · Primary osteoarthritis of left knee     715.16/M17.12  · Pain: Knee     719.46/M25.569      Plan  · Instructions  o Reviewed the patient's Past Medical, Social, and Family history as well as the ROS at today's visit, no changes.  o Call or return if worsening symptoms.  o Follow Up PRN.            Electronically Signed by: Elizabeth Cavazos PA-C -Author on February 3, 2021 12:43:18 PM  Electronically Co-signed by: Paolo Land MD " -Reviewer on February 4, 2021 08:31:49 PM

## 2021-05-14 NOTE — PROGRESS NOTES
Progress Note      Patient Name: Nani Shultz   Patient ID: [id number]   Sex: Female   YOB: 1970    Referring Provider: Paolo Land MD    Visit Date: January 13, 2021    Provider: Elizabeth Cavazos PA-C   Location: [facility]   Location Address: [address] [place] [address]  [id number]   Location Phone: [phone]          Chief Complaint  · Bilateral elbow pain   · Left knee pain       History Of Present Illness  Nani Shultz is a 50 year old /White female who presents today to [facility].      She is here for bilateral elbow pain. She has history of bilateral lateral epicondylitis. She usually manages with HEP, straps and OTC NSAIDs, but these are failing to improve her pain.     She also complains of left knee pain that started without injury/trauma a few days ago. She states pain is medial and worse with pivoting.       Past Medical History  Arthritis; Hyperlipemia; Hypertension; Seasonal allergies; Stroke         Past Surgical History  Hysterectomy; I have had no surgeries         Medication List  diclofenac sodium 75 mg oral tablet,delayed release (DR/EC); hydrochlorothiazide oral; loratadine oral; pravastatin 40 mg oral tablet; Wellbutrin SR oral         Allergy List  Codeine Phosphate; Codeine Sulfate; Wasp stings       Allergies Reconciled  Family Medical History  Heart Disease; Family history of certain chronic disabling diseases; arthritis; Osteoporosis; Family history of Arthritis         Social History  Alcohol Use (Never); Homemaker.; lives with spouse; .; Recreational Drug Use (Never); Tobacco (Former)         Review of Systems  · Constitutional  o Denies  o : fever, chills, weight loss  · Cardiovascular  o Denies  o : chest pain, shortness of breath  · Gastrointestinal  o Denies  o : liver disease, heartburn, nausea, blood in stools  · Genitourinary  o Denies  o : painful urination, blood in urine  · Integument  o Denies  o : rash,  "itching  · Neurologic  o Denies  o : headache, weakness, loss of consciousness  · Musculoskeletal  o Admits  o : painful, swollen joints  · Psychiatric  o Denies  o : drug/alcohol addiction, anxiety, depression      Vitals  Date Time BP Position Site L\R Cuff Size HR RR TEMP (F) WT  HT  BMI kg/m2 BSA m2 O2 Sat FR L/min FiO2 HC       01/13/2021 10:13 AM      91 - R   153lbs 6oz 5'  3\" 27.17 1.76 99 %            Physical Examination  · Constitutional  o Appearance  o : well developed, well-nourished, no obvious deformities present  · Head and Face  o Head  o :   § Inspection  § : normocephalic  o Face  o :   § Inspection  § : no facial lesions  · Eyes  o Conjunctivae  o : conjunctivae normal  o Sclerae  o : sclerae white  · Ears, Nose, Mouth and Throat  o Ears  o :   § External Ears  § : appearance within normal limits  § Hearing  § : intact  o Nose  o :   § External Nose  § : appearance normal  · Neck  o Inspection/Palpation  o : normal appearance  o Range of Motion  o : full range of motion  · Respiratory  o Respiratory Effort  o : breathing unlabored  o Inspection of Chest  o : normal appearance  o Auscultation of Lungs  o : no audible wheezing or rales  · Cardiovascular  o Heart  o : regular rate  · Gastrointestinal  o Abdominal Examination  o : soft and non-tender  · Skin and Subcutaneous Tissue  o General Inspection  o : intact, no rashes  · Psychiatric  o General  o : Alert and oriented x3  o Judgement and Insight  o : judgment and insight intact  o Mood and Affect  o : mood normal, affect appropriate  · Left Knee  o Inspection  o : No effusion or discoloration. Tender media joint line. Full ROM. + crepitus. Stable to varus/valgus stress. Negative Lachman's. Negative posterior sag. + Tracee's. Full weight bearing. Neurovascularly intact.  · Extremities  o Extremities  o : BILATERAL ELBOWS: No skin discoloration, atrophy or swelling. Palpable tenderness over the lateral epicondyle. Full ROM. Stable to " varus/valgus stress. Full pronation and supination. Neurovascularly and sensation grossly intact. + pain with resisted wrist/digit extension.   · Injection Note/Aspiration Note  o Site  o : bilateral elbows  o Procedure  o : Procedure: After educating the patient, patient gave consent for procedure. After using Chloraprep, the joint space was injected. The patient tolerated the procedure well.  o Medication  o : 80 mg of DepoMedrol with 1cc of 1% Lidocaine  · In Office Procedures  o View  o : LAT/SUNRISE/STANDING  o Site  o : left, knee  o Indication  o : Left knee pain  o Study  o : X-rays ordered, taken in the office, and reviewed today.  o Xray  o : Moderate knee degenerative changes  o Comparative Data  o : No comparative data found          Assessment  · Primary osteoarthritis of left knee     715.16/M17.12  · MMT (medial meniscus tear)     836.0/S83.249A  · Pain of both elbows       Pain in right elbow     719.42/M25.521  Pain in left elbow     719.42/M25.522  · Left knee pain, unspecified chronicity     719.46/M25.562      Plan  · Orders  o 2 - Depo-Medrol injection 80mg () - - 01/13/2021   Lot [id number] Exp 11 2021 [employer] Administered by SULEMA ELIAS PA-C   o 2 - Elbow-Lat Single Tendon (Injection) (14288) - - 01/13/2021   Lot [id number] Exp 03 01 2022 [employer] Administered by SULEMA ELIAS PA-C   o Knee (Left) [facility] Preferred View ([id number]) - 719.46/M25.562 - 01/13/2021  · Medications  o Medications have been Reconciled  o Transition of Care or Provider Policy  · Instructions  o Reviewed the patient's Past Medical, Social, and Family history as well as the ROS at today's visit, no changes.  o Call or return if worsening symptoms.  o Follow Up in 3 weeks.  o Continue conservative lateral epicondylitis treatments along with bilateral elbow injections today. HEP and OTC NSAIDs recommended for left knee.  o Electronically Identified Patient Education Materials Provided  Electronically            Electronically Signed by: Elizabeth Cavazos PA-C -Author on January 13, 2021 10:38:51 AM  Electronically Co-signed by: Paolo Land MD -Reviewer on January 14, 2021 11:18:01 PM

## 2021-05-15 VITALS — WEIGHT: 149 LBS | HEIGHT: 63 IN | BODY MASS INDEX: 26.4 KG/M2 | HEART RATE: 84 BPM | OXYGEN SATURATION: 98 %

## 2021-05-15 VITALS — BODY MASS INDEX: 26.8 KG/M2 | WEIGHT: 151.25 LBS | OXYGEN SATURATION: 98 % | HEIGHT: 63 IN | HEART RATE: 101 BPM

## 2021-05-26 ENCOUNTER — TRANSCRIBE ORDERS (OUTPATIENT)
Dept: ADMINISTRATIVE | Facility: HOSPITAL | Age: 51
End: 2021-05-26

## 2021-05-26 DIAGNOSIS — Z12.31 VISIT FOR SCREENING MAMMOGRAM: Primary | ICD-10-CM

## 2021-06-16 ENCOUNTER — HOSPITAL ENCOUNTER (OUTPATIENT)
Dept: MAMMOGRAPHY | Facility: HOSPITAL | Age: 51
Discharge: HOME | End: 2021-06-16
Admitting: NURSE PRACTITIONER

## 2021-06-16 DIAGNOSIS — Z12.31 VISIT FOR SCREENING MAMMOGRAM: ICD-10-CM

## 2021-06-16 PROCEDURE — 77067 SCR MAMMO BI INCL CAD: CPT

## 2021-06-30 ENCOUNTER — OFFICE VISIT (OUTPATIENT)
Dept: ORTHOPEDIC SURGERY | Facility: CLINIC | Age: 51
End: 2021-06-30

## 2021-06-30 VITALS — OXYGEN SATURATION: 98 % | BODY MASS INDEX: 26.22 KG/M2 | HEART RATE: 77 BPM | HEIGHT: 63 IN | WEIGHT: 148 LBS

## 2021-06-30 DIAGNOSIS — M25.522 PAIN OF BOTH ELBOWS: Primary | ICD-10-CM

## 2021-06-30 DIAGNOSIS — M77.12 LATERAL EPICONDYLITIS OF BOTH ELBOWS: ICD-10-CM

## 2021-06-30 DIAGNOSIS — M25.521 PAIN OF BOTH ELBOWS: Primary | ICD-10-CM

## 2021-06-30 DIAGNOSIS — M77.11 LATERAL EPICONDYLITIS OF BOTH ELBOWS: ICD-10-CM

## 2021-06-30 PROCEDURE — 20551 NJX 1 TENDON ORIGIN/INSJ: CPT | Performed by: PHYSICIAN ASSISTANT

## 2021-06-30 PROCEDURE — 99213 OFFICE O/P EST LOW 20 MIN: CPT | Performed by: PHYSICIAN ASSISTANT

## 2021-06-30 RX ORDER — BUPROPION HYDROCHLORIDE 150 MG/1
TABLET ORAL
COMMUNITY

## 2021-06-30 RX ORDER — METHYLPREDNISOLONE ACETATE 80 MG/ML
80 INJECTION, SUSPENSION INTRA-ARTICULAR; INTRALESIONAL; INTRAMUSCULAR; SOFT TISSUE
Status: COMPLETED | OUTPATIENT
Start: 2021-06-30 | End: 2021-06-30

## 2021-06-30 RX ORDER — LEVOTHYROXINE SODIUM 0.03 MG/1
TABLET ORAL
COMMUNITY

## 2021-06-30 RX ORDER — OMEPRAZOLE 20 MG/1
CAPSULE, DELAYED RELEASE ORAL
COMMUNITY
End: 2022-05-12

## 2021-06-30 RX ORDER — OLOPATADINE HYDROCHLORIDE 1 MG/ML
SOLUTION/ DROPS OPHTHALMIC
COMMUNITY

## 2021-06-30 RX ORDER — LISINOPRIL 10 MG/1
TABLET ORAL
COMMUNITY

## 2021-06-30 RX ORDER — LIDOCAINE HYDROCHLORIDE 10 MG/ML
3 INJECTION, SOLUTION INFILTRATION; PERINEURAL
Status: COMPLETED | OUTPATIENT
Start: 2021-06-30 | End: 2021-06-30

## 2021-06-30 RX ORDER — ERGOCALCIFEROL 1.25 MG/1
50000 CAPSULE ORAL WEEKLY
COMMUNITY
Start: 2021-04-14

## 2021-06-30 RX ORDER — ONDANSETRON 4 MG/1
TABLET, ORALLY DISINTEGRATING ORAL
COMMUNITY

## 2021-06-30 RX ORDER — EPINEPHRINE 0.3 MG/.3ML
INJECTION SUBCUTANEOUS
COMMUNITY

## 2021-06-30 RX ORDER — BIMATOPROST 0.01 %
DROPS OPHTHALMIC (EYE)
COMMUNITY

## 2021-06-30 RX ORDER — PRAVASTATIN SODIUM 40 MG
TABLET ORAL
COMMUNITY

## 2021-06-30 RX ORDER — HYDROCHLOROTHIAZIDE 25 MG/1
TABLET ORAL
COMMUNITY

## 2021-06-30 RX ORDER — FLUTICASONE PROPIONATE 50 MCG
SPRAY, SUSPENSION (ML) NASAL
COMMUNITY
Start: 2021-05-26

## 2021-06-30 RX ADMIN — LIDOCAINE HYDROCHLORIDE 3 ML: 10 INJECTION, SOLUTION INFILTRATION; PERINEURAL at 10:19

## 2021-06-30 RX ADMIN — LIDOCAINE HYDROCHLORIDE 3 ML: 10 INJECTION, SOLUTION INFILTRATION; PERINEURAL at 10:18

## 2021-06-30 RX ADMIN — METHYLPREDNISOLONE ACETATE 80 MG: 80 INJECTION, SUSPENSION INTRA-ARTICULAR; INTRALESIONAL; INTRAMUSCULAR; SOFT TISSUE at 10:18

## 2021-06-30 RX ADMIN — METHYLPREDNISOLONE ACETATE 80 MG: 80 INJECTION, SUSPENSION INTRA-ARTICULAR; INTRALESIONAL; INTRAMUSCULAR; SOFT TISSUE at 10:19

## 2021-06-30 NOTE — PROGRESS NOTES
"Chief Complaint  Pain of the Left Elbow and Pain of the Right Elbow    Subjective          Nani Shlutz presents to [facility] for bilateral elbow pain.  She has a history of bilateral epicondylitis.  She used to be a  and over the years developed pain in both of her elbows.  She states that typically injections help with her pain.  She has tried bracing, home exercises, NSAIDs in the past without relief.  She denies any trauma.  Objective   Vital Signs:   Pulse 77   Ht 160 cm (63\")   Wt 67.1 kg (148 lb)   SpO2 98%   BMI 26.22 kg/m²       Physical Exam  Constitutional:       Appearance: Normal appearance. He is well-developed and normal weight.   HENT:      Head: Normocephalic.      Right Ear: Hearing and external ear normal.      Left Ear: Hearing and external ear normal.      Nose: Nose normal.   Eyes:      Conjunctiva/sclera: Conjunctivae normal.   Cardiovascular:      Rate and Rhythm: Normal rate.   Pulmonary:      Effort: Pulmonary effort is normal.      Breath sounds: No wheezing or rales.   Abdominal:      Palpations: Abdomen is soft.      Tenderness: There is no abdominal tenderness.   Musculoskeletal:      Cervical back: Normal range of motion.   Skin:     Findings: No rash.   Neurological:      Mental Status: He is alert and oriented to person, place, and time.   Psychiatric:         Mood and Affect: Mood and affect normal.         Judgment: Judgment normal.     Ortho Exam  Left elbow: Tenderness to palpation over the left lateral epicondyle without swelling, full extension and flexion, full supination pronation, good  strength in bilateral upper extremities that is symmetric and within normal limits.  Sensation to light touch is intact in the upper stricture immitis bilaterally.  Radial pulses 2+ bilaterally.  Patient has pain at the lateral epicondyle with extension of the left wrist.   Right elbow: Tenderness to palpation over the right lateral epicondyle " without swelling, full's extension, full flexion and supination and pronation.  Good  strength bilaterally.  Patient has pain at the lateral epicondyle with extension of the right wrist.  Result Review :            Imaging Results (Most Recent)     None         Medium Joint Arthrocentesis: L elbow  Date/Time: 6/30/2021 10:18 AM  Procedure Details  Location: elbow - L elbow  Preparation: Patient was prepped and draped in the usual sterile fashion  Needle size: 23 G  Medications administered: 80 mg methylPREDNISolone acetate 80 MG/ML; 3 mL lidocaine 1 %  Patient tolerance: patient tolerated the procedure well with no immediate complications    Medium Joint Arthrocentesis: R elbow  Date/Time: 6/30/2021 10:19 AM  Procedure Details  Location: elbow - R elbow  Preparation: Patient was prepped and draped in the usual sterile fashion  Needle size: 23 G  Medications administered: 80 mg methylPREDNISolone acetate 80 MG/ML; 3 mL lidocaine 1 %  Patient tolerance: patient tolerated the procedure well with no immediate complications            Assessment and Plan    Problem List Items Addressed This Visit        Musculoskeletal and Injuries    Pain of both elbows - Primary    Lateral epicondylitis of both elbows    Current Assessment & Plan     Patient elected to receive bilateral elbow injections for lateral epicondylitis today.  Tolerated procedures well, risk and benefits discussed.  We will follow up as needed in the future.               Follow Up   Return if symptoms worsen or fail to improve.  Patient Instructions   Rest ice and elevate, follow-up as needed    Patient was given instructions and counseling regarding her condition or for health maintenance advice. Please see specific information pulled into the AVS if appropriate.

## 2021-06-30 NOTE — ASSESSMENT & PLAN NOTE
Patient elected to receive bilateral elbow injections for lateral epicondylitis today.  Tolerated procedures well, risk and benefits discussed.  We will follow up as needed in the future.

## 2021-07-12 ENCOUNTER — TELEPHONE (OUTPATIENT)
Dept: ORTHOPEDIC SURGERY | Facility: CLINIC | Age: 51
End: 2021-07-12

## 2021-07-12 NOTE — TELEPHONE ENCOUNTER
6-30-21 patient stopped by office after appointment. Each elbow was swollen with no warmth or redness. She was concerned the medication did not go into the joint space. I explained that was a possibility and instructed her to notify us if she did not have relief from the shots and we would talk to the doctor.  I encouraged patient to ice, elevated and gentle rom. Patient is to call with any concerns.

## 2022-01-19 ENCOUNTER — TELEPHONE (OUTPATIENT)
Dept: ORTHOPEDIC SURGERY | Facility: CLINIC | Age: 52
End: 2022-01-19

## 2022-01-19 NOTE — TELEPHONE ENCOUNTER
Caller: PATIENT     Relationship to patient: SELF    Best call back number:  [phone]      Chief complaint: BILATERAL ELBOW PAIN    Type of visit: CORTISONE INJECTIONS IN BOTH ELBOWS         Additional notes: PT. ASKING IF SHE CAN SCHEDULE CORTISONE INJECTIONS FOR BOTH ELBOWS.   PT. REQUESTING INJECTIONS TO BE WITH DR. MOORE- DOES NOT WANT TO SEE PA.   PLEASE CALL TO ADVISE.

## 2022-01-24 ENCOUNTER — TRANSCRIBE ORDERS (OUTPATIENT)
Dept: ADMINISTRATIVE | Facility: HOSPITAL | Age: 52
End: 2022-01-24

## 2022-01-24 DIAGNOSIS — Z13.820 SCREENING FOR OSTEOPOROSIS: Primary | ICD-10-CM

## 2022-01-31 ENCOUNTER — OFFICE VISIT (OUTPATIENT)
Dept: ORTHOPEDIC SURGERY | Facility: CLINIC | Age: 52
End: 2022-01-31

## 2022-01-31 VITALS — HEIGHT: 63 IN | WEIGHT: 148 LBS | BODY MASS INDEX: 26.22 KG/M2

## 2022-01-31 DIAGNOSIS — M77.11 LATERAL EPICONDYLITIS OF BOTH ELBOWS: Primary | ICD-10-CM

## 2022-01-31 DIAGNOSIS — M77.12 LATERAL EPICONDYLITIS OF BOTH ELBOWS: Primary | ICD-10-CM

## 2022-01-31 PROCEDURE — 20551 NJX 1 TENDON ORIGIN/INSJ: CPT | Performed by: ORTHOPAEDIC SURGERY

## 2022-01-31 RX ADMIN — TRIAMCINOLONE ACETONIDE 40 MG: 40 INJECTION, SUSPENSION INTRA-ARTICULAR; INTRAMUSCULAR at 16:34

## 2022-01-31 RX ADMIN — BUPIVACAINE HYDROCHLORIDE 1 ML: 2.5 INJECTION, SOLUTION INFILTRATION; PERINEURAL at 16:34

## 2022-02-02 RX ORDER — TRIAMCINOLONE ACETONIDE 40 MG/ML
40 INJECTION, SUSPENSION INTRA-ARTICULAR; INTRAMUSCULAR
Status: COMPLETED | OUTPATIENT
Start: 2022-01-31 | End: 2022-01-31

## 2022-02-02 RX ORDER — BUPIVACAINE HYDROCHLORIDE 2.5 MG/ML
1 INJECTION, SOLUTION INFILTRATION; PERINEURAL
Status: COMPLETED | OUTPATIENT
Start: 2022-01-31 | End: 2022-01-31

## 2022-02-03 ENCOUNTER — APPOINTMENT (OUTPATIENT)
Dept: BONE DENSITY | Facility: HOSPITAL | Age: 52
End: 2022-02-03

## 2022-02-17 ENCOUNTER — HOSPITAL ENCOUNTER (OUTPATIENT)
Dept: BONE DENSITY | Facility: HOSPITAL | Age: 52
Discharge: HOME | End: 2022-02-17
Admitting: NURSE PRACTITIONER

## 2022-02-17 DIAGNOSIS — Z13.820 SCREENING FOR OSTEOPOROSIS: ICD-10-CM

## 2022-02-17 PROCEDURE — 77080 DXA BONE DENSITY AXIAL: CPT

## 2022-03-23 ENCOUNTER — TELEPHONE (OUTPATIENT)
Dept: OBSTETRICS AND GYNECOLOGY | Facility: CLINIC | Age: 52
End: 2022-03-23

## 2022-03-23 ENCOUNTER — OFFICE VISIT (OUTPATIENT)
Dept: OBSTETRICS AND GYNECOLOGY | Facility: CLINIC | Age: 52
End: 2022-03-23

## 2022-03-23 VITALS
SYSTOLIC BLOOD PRESSURE: 117 MMHG | DIASTOLIC BLOOD PRESSURE: 79 MMHG | BODY MASS INDEX: 25.16 KG/M2 | HEIGHT: 63 IN | WEIGHT: 142 LBS | HEART RATE: 84 BPM

## 2022-03-23 DIAGNOSIS — N93.9 ABNORMAL UTERINE BLEEDING (AUB): Primary | ICD-10-CM

## 2022-03-23 DIAGNOSIS — N95.1 PERIMENOPAUSE: ICD-10-CM

## 2022-03-23 DIAGNOSIS — Z98.890 HISTORY OF ENDOMETRIAL ABLATION: ICD-10-CM

## 2022-03-23 PROBLEM — E55.9 VITAMIN D DEFICIENCY: Status: ACTIVE | Noted: 2021-05-26

## 2022-03-23 PROBLEM — M19.90 ARTHRITIS: Status: ACTIVE | Noted: 2020-04-20

## 2022-03-23 PROBLEM — I10 BENIGN ESSENTIAL HYPERTENSION: Status: ACTIVE | Noted: 2019-10-21

## 2022-03-23 PROBLEM — F41.9 ANXIETY: Status: ACTIVE | Noted: 2020-04-20

## 2022-03-23 PROBLEM — E03.9 HYPOTHYROIDISM: Status: ACTIVE | Noted: 2021-05-26

## 2022-03-23 PROBLEM — F41.8 MIXED ANXIETY DEPRESSIVE DISORDER: Status: ACTIVE | Noted: 2022-03-23

## 2022-03-23 PROBLEM — E78.5 HYPERLIPEMIA: Status: ACTIVE | Noted: 2022-03-23

## 2022-03-23 LAB
ESTRADIOL SERPL HS-MCNC: 44.1 PG/ML
FSH SERPL-ACNC: 68.5 MIU/ML

## 2022-03-23 PROCEDURE — 87624 HPV HI-RISK TYP POOLED RSLT: CPT | Performed by: OBSTETRICS & GYNECOLOGY

## 2022-03-23 PROCEDURE — 83001 ASSAY OF GONADOTROPIN (FSH): CPT | Performed by: OBSTETRICS & GYNECOLOGY

## 2022-03-23 PROCEDURE — G0123 SCREEN CERV/VAG THIN LAYER: HCPCS | Performed by: OBSTETRICS & GYNECOLOGY

## 2022-03-23 PROCEDURE — 87491 CHLMYD TRACH DNA AMP PROBE: CPT | Performed by: OBSTETRICS & GYNECOLOGY

## 2022-03-23 PROCEDURE — 82670 ASSAY OF TOTAL ESTRADIOL: CPT | Performed by: OBSTETRICS & GYNECOLOGY

## 2022-03-23 PROCEDURE — 99214 OFFICE O/P EST MOD 30 MIN: CPT | Performed by: OBSTETRICS & GYNECOLOGY

## 2022-03-23 PROCEDURE — 87591 N.GONORRHOEAE DNA AMP PROB: CPT | Performed by: OBSTETRICS & GYNECOLOGY

## 2022-03-23 NOTE — PROGRESS NOTES
"GYN Visit    CC: Vaginal bleeding    HPI:   51 y.o. who presents for abnormal vaginal bleeding. History of endometrial ablation 4 years ago. No menses since. Last month bled for 5 days. Light flow, but painful.     History: PMHx, Meds, Allergies, PSHx, Social Hx, and POBHx all reviewed and updated.    /79   Pulse 84   Ht 160 cm (63\")   Wt 64.4 kg (142 lb)   Breastfeeding No   BMI 25.15 kg/m²     Physical Exam  Vitals and nursing note reviewed. Exam conducted with a chaperone present.   Constitutional:       General: She is not in acute distress.     Appearance: Normal appearance. She is normal weight. She is not ill-appearing.   HENT:      Head: Normocephalic and atraumatic.   Abdominal:      General: Abdomen is flat. There is no distension.      Palpations: Abdomen is soft. There is no mass.      Tenderness: There is no abdominal tenderness. There is no guarding or rebound.      Hernia: No hernia is present.   Genitourinary:     General: Normal vulva.      Exam position: Lithotomy position.      Pubic Area: No rash.       Labia:         Right: No rash, tenderness, lesion or injury.         Left: No rash, tenderness, lesion or injury.       Urethra: No prolapse, urethral pain, urethral swelling or urethral lesion.      Vagina: Normal.      Cervix: Normal.      Uterus: Normal.       Adnexa: Right adnexa normal and left adnexa normal.   Musculoskeletal:         General: No swelling.      Right lower leg: No edema.      Left lower leg: No edema.   Skin:     General: Skin is warm and dry.      Findings: No rash.   Neurological:      Mental Status: She is alert and oriented to person, place, and time.   Psychiatric:         Mood and Affect: Mood normal.         Behavior: Behavior normal.         Thought Content: Thought content normal.         Judgment: Judgment normal.         ASSESSMENT AND PLAN:  Diagnoses and all orders for this visit:    1. Abnormal uterine bleeding (AUB) (Primary)  Assessment & " Plan:  Discussed with the patient that the fact that she had a menstrual flow after endometrial ablation is not necessarily abnormal.  The only concern that I have is with this bleeding returned to be heavy, and at the patient's age is this premenopausal bleeding or postmenopausal bleeding.  We discussed the concerns with postmenopausal bleeding and the difficulties in further evaluation due to the history of the endometrial ablation.    Orders:  -     Pap IG, Ct-Ng, Rfx HPV ASCU; Future  -     US Pelvis Transvaginal Non OB; Future  -     Follicle Stimulating Hormone  -     Estradiol  -     Pap IG, Ct-Ng, Rfx HPV ASCU  -     PAP, Liquid Based ([facility] Only); Future  -     Cancel: PAP, Liquid Based ([facility] Only)  -     IGP,CtNg,AptimaHPV,rfx16 / 18,45; Future  -     IGP,CtNg,AptimaHPV,rfx16 / 18,45    2. History of endometrial ablation  Overview:  2017      3. Perimenopause      Follow Up:  Return in about 3 weeks (around 4/13/2022) for Recheck.      Jer Garcia MD  03/23/2022

## 2022-03-24 PROBLEM — N93.9 ABNORMAL UTERINE BLEEDING (AUB): Status: ACTIVE | Noted: 2022-03-24

## 2022-03-24 PROBLEM — Z98.890 HISTORY OF ENDOMETRIAL ABLATION: Status: ACTIVE | Noted: 2022-03-24

## 2022-03-24 PROBLEM — N95.1 PERIMENOPAUSE: Status: ACTIVE | Noted: 2022-03-24

## 2022-03-24 NOTE — ASSESSMENT & PLAN NOTE
Discussed with the patient that the fact that she had a menstrual flow after endometrial ablation is not necessarily abnormal.  The only concern that I have is with this bleeding returned to be heavy, and at the patient's age is this premenopausal bleeding or postmenopausal bleeding.  We discussed the concerns with postmenopausal bleeding and the difficulties in further evaluation due to the history of the endometrial ablation.

## 2022-03-29 LAB
C TRACH RRNA CVX QL NAA+PROBE: NEGATIVE
CYTOLOGIST CVX/VAG CYTO: NORMAL
CYTOLOGY CVX/VAG DOC CYTO: NORMAL
CYTOLOGY CVX/VAG DOC THIN PREP: NORMAL
DX ICD CODE: NORMAL
HIV 1 & 2 AB SER-IMP: NORMAL
HPV I/H RISK 4 DNA CVX QL PROBE+SIG AMP: NEGATIVE
N GONORRHOEA RRNA CVX QL NAA+PROBE: NEGATIVE
OTHER STN SPEC: NORMAL
STAT OF ADQ CVX/VAG CYTO-IMP: NORMAL

## 2022-03-31 DIAGNOSIS — N93.9 ABNORMAL UTERINE BLEEDING (AUB): Primary | ICD-10-CM

## 2022-05-11 ENCOUNTER — OFFICE VISIT (OUTPATIENT)
Dept: OBSTETRICS AND GYNECOLOGY | Facility: CLINIC | Age: 52
End: 2022-05-11

## 2022-05-11 VITALS
HEIGHT: 63 IN | SYSTOLIC BLOOD PRESSURE: 124 MMHG | HEART RATE: 74 BPM | WEIGHT: 145 LBS | DIASTOLIC BLOOD PRESSURE: 69 MMHG | BODY MASS INDEX: 25.69 KG/M2

## 2022-05-11 DIAGNOSIS — E03.9 ACQUIRED HYPOTHYROIDISM: ICD-10-CM

## 2022-05-11 DIAGNOSIS — Z98.890 HISTORY OF ENDOMETRIAL ABLATION: ICD-10-CM

## 2022-05-11 DIAGNOSIS — N93.9 ABNORMAL UTERINE BLEEDING (AUB): Primary | ICD-10-CM

## 2022-05-11 DIAGNOSIS — N95.1 PERIMENOPAUSE: ICD-10-CM

## 2022-05-11 DIAGNOSIS — I10 BENIGN ESSENTIAL HYPERTENSION: ICD-10-CM

## 2022-05-11 PROCEDURE — 99213 OFFICE O/P EST LOW 20 MIN: CPT | Performed by: OBSTETRICS & GYNECOLOGY

## 2022-05-11 RX ORDER — CEFDINIR 300 MG/1
CAPSULE ORAL
COMMUNITY

## 2022-05-11 NOTE — PROGRESS NOTES
"GYN Visit    CC: Follow-up ultrasound    HPI:   51 y.o. who presents in follow-up of a pelvic ultrasound for bleeding post ablation.  Patient had ablation about 4 years ago.  She is only had 1 episode of bleeding since that ablation which was approximate last month.  Since her last visit she is only had 1 day of light spotting.  No other concerns today.  No other problems today.    History: PMHx, Meds, Allergies, PSHx, Social Hx, and POBHx all reviewed and updated.    /69   Pulse 74   Ht 160 cm (63\")   Wt 65.8 kg (145 lb)   BMI 25.69 kg/m²     Physical Exam  Vitals and nursing note reviewed.   Constitutional:       Appearance: Normal appearance.   Skin:     General: Skin is warm and dry.      Findings: No lesion or rash.   Neurological:      Mental Status: She is alert and oriented to person, place, and time.   Psychiatric:         Mood and Affect: Mood normal.         Behavior: Behavior normal.         Thought Content: Thought content normal.         Judgment: Judgment normal.       Ultrasound 2022 reviewed  The endometrium is 3 mm in thickness    ASSESSMENT AND PLAN:  Diagnoses and all orders for this visit:    1. Abnormal uterine bleeding (AUB) (Primary)  Assessment & Plan:  The patient has not had any significant bleeding since the last visit.  She has no other new concerns today.  I truly suspect this is more likely perimenopausal bleeding rather than postmenopausal bleeding.  After discussing with the patient the findings of the ultrasound which showed a very thin endometrium of 3 mm she would like to observe her menstrual flow rather than consider endometrial sampling or surgery at this time.  If she has any further significant bleeding we would likely need to at least attempt endometrial sampling.  We have discussed the difficulties of sampling of the endometrium after endometrial ablation.  The patient will closely monitor for any further menstrual flow with notify me if it " occurs.      2. History of endometrial ablation  Overview:  2017      3. Perimenopause  Assessment & Plan:  Discussed perimenopausal changes as well as hot flashes.  I suspect the patient should be menopausal soon.  If she does have further menstrual bleeding she will likely need endometrial sampling.      4. Acquired hypothyroidism  Assessment & Plan:  Continue Synthroid      5. Benign essential hypertension  Assessment & Plan:  Continue lisinopril        Counseling: TRACK MENSES, RTO if <q21d, >7d long, heavy or painful.      Follow Up:  Return in about 2 months (around 7/11/2022).    Jer Garcia MD  05/11/2022

## 2022-05-12 NOTE — ASSESSMENT & PLAN NOTE
The patient has not had any significant bleeding since the last visit.  She has no other new concerns today.  I truly suspect this is more likely perimenopausal bleeding rather than postmenopausal bleeding.  After discussing with the patient the findings of the ultrasound which showed a very thin endometrium of 3 mm she would like to observe her menstrual flow rather than consider endometrial sampling or surgery at this time.  If she has any further significant bleeding we would likely need to at least attempt endometrial sampling.  We have discussed the difficulties of sampling of the endometrium after endometrial ablation.  The patient will closely monitor for any further menstrual flow with notify me if it occurs.

## 2022-05-12 NOTE — ASSESSMENT & PLAN NOTE
Discussed perimenopausal changes as well as hot flashes.  I suspect the patient should be menopausal soon.  If she does have further menstrual bleeding she will likely need endometrial sampling.

## 2022-07-11 ENCOUNTER — TELEPHONE (OUTPATIENT)
Dept: ORTHOPEDIC SURGERY | Facility: CLINIC | Age: 52
End: 2022-07-11

## 2022-07-11 NOTE — TELEPHONE ENCOUNTER
Caller: Nani Shultz    Relationship to patient: Self    Best call back number:     Type of visit: INJECTIONS  Requested date: Wednesday 7-13-22    Additional notes:REQUESTING AN APPT FOR BILATERAL ELBOW CORTISONE INJECTIONS. REALLY HOPING SHE CAN GET IN ON Wednesday THE 13TH. PLEASE CALL TO ADVISE, THANK YOU!

## 2022-07-13 ENCOUNTER — OFFICE VISIT (OUTPATIENT)
Dept: OBSTETRICS AND GYNECOLOGY | Facility: CLINIC | Age: 52
End: 2022-07-13

## 2022-07-13 VITALS — WEIGHT: 137 LBS | BODY MASS INDEX: 24.27 KG/M2 | SYSTOLIC BLOOD PRESSURE: 116 MMHG | DIASTOLIC BLOOD PRESSURE: 74 MMHG

## 2022-07-13 DIAGNOSIS — N93.9 ABNORMAL UTERINE BLEEDING (AUB): Primary | ICD-10-CM

## 2022-07-13 DIAGNOSIS — Z98.890 HISTORY OF ENDOMETRIAL ABLATION: ICD-10-CM

## 2022-07-13 DIAGNOSIS — N95.1 VASOMOTOR SYMPTOMS DUE TO MENOPAUSE: ICD-10-CM

## 2022-07-13 PROCEDURE — 99213 OFFICE O/P EST LOW 20 MIN: CPT | Performed by: OBSTETRICS & GYNECOLOGY

## 2022-07-13 NOTE — PROGRESS NOTES
GYN Visit    CC: Follow-up abnormal menses    HPI:   51 y.o. who presents in follow-up of some abnormal bleeding after an endometrial ablation.  The patient's bleeding occurred several years after her ablation.  It was really only one episode.  The patient denies any bleeding since that time.  She does note an increase amount of hot flashes and night sweats.  She has no new problems or concerns.      History: PMHx, Meds, Allergies, PSHx, Social Hx, and POBHx all reviewed and updated.    /74   Wt 62.1 kg (137 lb)   BMI 24.27 kg/m²     Physical Exam  Vitals and nursing note reviewed.   Constitutional:       General: She is not in acute distress.     Appearance: Normal appearance. She is not ill-appearing.   Abdominal:      General: Abdomen is flat. There is no distension.      Palpations: Abdomen is soft. There is no mass.      Tenderness: There is no abdominal tenderness. There is no guarding or rebound.   Musculoskeletal:         General: No swelling.      Right lower leg: No edema.      Left lower leg: No edema.   Skin:     General: Skin is warm and dry.      Findings: No rash.   Neurological:      Mental Status: She is alert and oriented to person, place, and time.   Psychiatric:         Mood and Affect: Mood normal.         Behavior: Behavior normal.         Thought Content: Thought content normal.         Judgment: Judgment normal.           ASSESSMENT AND PLAN:  Diagnoses and all orders for this visit:    1. Abnormal uterine bleeding (AUB) (Primary)  Assessment & Plan:  The etiology of the patient's bleeding is unclear.  This could be some perimenopausal bleeding.  Patient's labs are consistent mostly with perimenopause and not menopause given her elevated estradiol level.  The patient is not taking any exogenous hormones.  Given the patient has had no further symptoms continue to track.  If she has any further bleeding she is to follow-up with me ASAP.      2. History of endometrial  ablation  Overview:  2017      3. Vasomotor symptoms due to menopause  Assessment & Plan:  We have discussed perimenopausal and postmenopausal vasomotor symptoms.  We discussed both OTC and prescription medications that can be used to treat the symptoms.  We will continue to monitor for now especially given the patient's unusual bleeding.  If the patient continues to have no further bleeding issues and is requiring treatment we could consider hormone replacement therapy or other prescription therapy that is not hormone replacement.        Follow Up:  Return if symptoms worsen or fail to improve.    Jer Garcia MD  07/13/2022

## 2022-07-13 NOTE — ASSESSMENT & PLAN NOTE
The etiology of the patient's bleeding is unclear.  This could be some perimenopausal bleeding.  Patient's labs are consistent mostly with perimenopause and not menopause given her elevated estradiol level.  The patient is not taking any exogenous hormones.  Given the patient has had no further symptoms continue to track.  If she has any further bleeding she is to follow-up with me ASAP.

## 2022-07-13 NOTE — ASSESSMENT & PLAN NOTE
We have discussed perimenopausal and postmenopausal vasomotor symptoms.  We discussed both OTC and prescription medications that can be used to treat the symptoms.  We will continue to monitor for now especially given the patient's unusual bleeding.  If the patient continues to have no further bleeding issues and is requiring treatment we could consider hormone replacement therapy or other prescription therapy that is not hormone replacement.

## 2022-08-03 ENCOUNTER — OFFICE VISIT (OUTPATIENT)
Dept: ORTHOPEDIC SURGERY | Facility: CLINIC | Age: 52
End: 2022-08-03

## 2022-08-03 VITALS — WEIGHT: 137 LBS | HEIGHT: 63 IN | BODY MASS INDEX: 24.27 KG/M2

## 2022-08-03 DIAGNOSIS — M77.11 LATERAL EPICONDYLITIS OF BOTH ELBOWS: Primary | ICD-10-CM

## 2022-08-03 DIAGNOSIS — M77.12 LATERAL EPICONDYLITIS OF BOTH ELBOWS: Primary | ICD-10-CM

## 2022-08-03 PROCEDURE — 20551 NJX 1 TENDON ORIGIN/INSJ: CPT | Performed by: ORTHOPAEDIC SURGERY

## 2022-08-03 RX ORDER — TRIAMCINOLONE ACETONIDE 40 MG/ML
40 INJECTION, SUSPENSION INTRA-ARTICULAR; INTRAMUSCULAR
Status: COMPLETED | OUTPATIENT
Start: 2022-08-03 | End: 2022-08-03

## 2022-08-03 RX ORDER — LIDOCAINE HYDROCHLORIDE 10 MG/ML
1 INJECTION, SOLUTION INFILTRATION; PERINEURAL
Status: COMPLETED | OUTPATIENT
Start: 2022-08-03 | End: 2022-08-03

## 2022-08-03 RX ADMIN — LIDOCAINE HYDROCHLORIDE 1 ML: 10 INJECTION, SOLUTION INFILTRATION; PERINEURAL at 14:49

## 2022-08-03 RX ADMIN — TRIAMCINOLONE ACETONIDE 40 MG: 40 INJECTION, SUSPENSION INTRA-ARTICULAR; INTRAMUSCULAR at 14:49

## 2022-08-03 NOTE — PROGRESS NOTES
"Chief Complaint  Follow-up of the Left Elbow and Follow-up of the Right Elbow     Subjective      Nani Shultz presents to [facility] for a follow-up of bilateral elbows. She has a history of bilateral epicondylitis, she was last seen in January. She had injections that visit that provided her relief until recently. No new injuries or trauma. No numbness and tingling. She is requesting repeat injections today.     Allergies   Allergen Reactions   • Codeine Nausea And Vomiting   • Wasp Venom Protein Hives        Social History     Socioeconomic History   • Marital status:    • Number of children: 2   Tobacco Use   • Smoking status: Former Smoker   • Smokeless tobacco: Never Used   Vaping Use   • Vaping Use: Never used   Substance and Sexual Activity   • Alcohol use: Never   • Drug use: Never   • Sexual activity: Not Currently        Review of Systems     Objective   Vital Signs:   Ht 160 cm (63\")   Wt 62.1 kg (137 lb)   BMI 24.27 kg/m²       Physical Exam  Constitutional:       Appearance: Normal appearance. Patient is well-developed and normal weight.   HENT:      Head: Normocephalic.      Right Ear: Hearing and external ear normal.      Left Ear: Hearing and external ear normal.      Nose: Nose normal.   Eyes:      Conjunctiva/sclera: Conjunctivae normal.   Cardiovascular:      Rate and Rhythm: Normal rate.   Pulmonary:      Effort: Pulmonary effort is normal.      Breath sounds: No wheezing or rales.   Abdominal:      Palpations: Abdomen is soft.      Tenderness: There is no abdominal tenderness.   Musculoskeletal:      Cervical back: Normal range of motion.   Skin:     Findings: No rash.   Neurological:      Mental Status: Patient  is alert and oriented to person, place, and time.   Psychiatric:         Mood and Affect: Mood and affect normal.         Judgment: Judgment normal.       Ortho Exam      BILATERAL ELBOWS: Tender lateral epicondyle. Sensation grossly intact. " Neurovascular intact.  Good tone of deltoid, biceps, triceps, wrist extensors, and wrist flexors.  Radial pulse 2+, ulnar pulse 2+. Skin intact. No swelling, skin discoloration or atrophy. Full elbow flexion and extension. 90 degrees supination and pronation. Pain with resisted wrist extension.       Small Joint Arthrocentesis  Consent given by: patient  Site marked: site marked  Timeout: Immediately prior to procedure a time out was called to verify the correct patient, procedure, equipment, support staff and site/side marked as required   Procedure Details  Preparation: Patient was prepped and draped in the usual sterile fashion  Medications administered: 40 mg triamcinolone acetonide 40 MG/ML; 1 mL lidocaine 1 %  Patient tolerance: patient tolerated the procedure well with no immediate complications    Small Joint Arthrocentesis  Consent given by: patient  Site marked: site marked  Timeout: Immediately prior to procedure a time out was called to verify the correct patient, procedure, equipment, support staff and site/side marked as required   Procedure Details  Preparation: Patient was prepped and draped in the usual sterile fashion  Needle gauge: 23g.  Medications administered: 40 mg triamcinolone acetonide 40 MG/ML; 1 mL lidocaine 1 %  Patient tolerance: patient tolerated the procedure well with no immediate complications              Imaging Results (Most Recent)     None           Result Review :       No results found.           Assessment and Plan     Diagnoses and all orders for this visit:    1. Lateral epicondylitis of both elbows (Primary)        Bilateral elbow injections given, patient tolerated this well.     Call or return if worsening symptoms.    Follow Up     PRN.      Patient was given instructions and counseling regarding her condition or for health maintenance advice. Please see specific information pulled into the AVS if appropriate.     Scribed for Paolo Land MD by Peggy  Araceli.  08/03/22   13:06 EDT    I have personally performed the services described in this document as scribed by the above individual and it is both accurate and complete. Paolo Land MD 08/03/22

## 2022-12-28 ENCOUNTER — OFFICE VISIT (OUTPATIENT)
Dept: ORTHOPEDIC SURGERY | Facility: CLINIC | Age: 52
End: 2022-12-28

## 2022-12-28 VITALS — HEIGHT: 63 IN | WEIGHT: 137 LBS | BODY MASS INDEX: 24.27 KG/M2

## 2022-12-28 DIAGNOSIS — M77.12 LATERAL EPICONDYLITIS OF BOTH ELBOWS: Primary | ICD-10-CM

## 2022-12-28 DIAGNOSIS — M77.11 LATERAL EPICONDYLITIS OF BOTH ELBOWS: Primary | ICD-10-CM

## 2022-12-28 PROCEDURE — 20551 NJX 1 TENDON ORIGIN/INSJ: CPT | Performed by: ORTHOPAEDIC SURGERY

## 2022-12-28 RX ORDER — TRIAMCINOLONE ACETONIDE 40 MG/ML
40 INJECTION, SUSPENSION INTRA-ARTICULAR; INTRAMUSCULAR
Status: COMPLETED | OUTPATIENT
Start: 2022-12-28 | End: 2022-12-28

## 2022-12-28 RX ORDER — LIDOCAINE HYDROCHLORIDE 10 MG/ML
1 INJECTION, SOLUTION INFILTRATION; PERINEURAL
Status: COMPLETED | OUTPATIENT
Start: 2022-12-28 | End: 2022-12-28

## 2022-12-28 RX ADMIN — LIDOCAINE HYDROCHLORIDE 1 ML: 10 INJECTION, SOLUTION INFILTRATION; PERINEURAL at 08:58

## 2022-12-28 RX ADMIN — TRIAMCINOLONE ACETONIDE 40 MG: 40 INJECTION, SUSPENSION INTRA-ARTICULAR; INTRAMUSCULAR at 08:58

## 2022-12-28 NOTE — PROGRESS NOTES
"Chief Complaint  Follow-up of the Right Elbow and Follow-up of the Left Elbow     Subjective      Nani Shultz presents to [facility] for follow up of bilateral elbows. She has a history of bilateral epicondylitis, she was last seen in January. She had injections that visit that provided her relief until recently. No new injuries or trauma. No numbness and tingling. She is requesting repeat injections today. Her last injection was 8/3/22.     Allergies   Allergen Reactions   • Codeine Nausea And Vomiting   • Wasp Venom Protein Hives        Social History     Socioeconomic History   • Marital status:    • Number of children: 2   Tobacco Use   • Smoking status: Former   • Smokeless tobacco: Never   Vaping Use   • Vaping Use: Never used   Substance and Sexual Activity   • Alcohol use: Never   • Drug use: Never   • Sexual activity: Not Currently        Review of Systems     Objective   Vital Signs:   Ht 160 cm (63\")   Wt 62.1 kg (137 lb)   BMI 24.27 kg/m²       Physical Exam  Constitutional:       Appearance: Normal appearance. Patient is well-developed and normal weight.   HENT:      Head: Normocephalic.      Right Ear: Hearing and external ear normal.      Left Ear: Hearing and external ear normal.      Nose: Nose normal.   Eyes:      Conjunctiva/sclera: Conjunctivae normal.   Cardiovascular:      Rate and Rhythm: Normal rate.   Pulmonary:      Effort: Pulmonary effort is normal.      Breath sounds: No wheezing or rales.   Abdominal:      Palpations: Abdomen is soft.      Tenderness: There is no abdominal tenderness.   Musculoskeletal:      Cervical back: Normal range of motion.   Skin:     Findings: No rash.   Neurological:      Mental Status: Patient  is alert and oriented to person, place, and time.   Psychiatric:         Mood and Affect: Mood and affect normal.         Judgment: Judgment normal.       Ortho Exam      BILATERAL ELBOWS: Tender lateral epicondyle. Sensation " grossly intact. Neurovascular intact.  Good tone of deltoid, biceps, triceps, wrist extensors, and wrist flexors.  Radial pulse 2+, ulnar pulse 2+. Skin intact. No swelling, skin discoloration or atrophy. Full elbow flexion and extension. 90 degrees supination and pronation. Pain with resisted wrist extension.       Medium Joint Arthrocentesis: R elbow  Date/Time: 12/28/2022 8:58 AM  Consent given by: patient  Site marked: site marked  Timeout: Immediately prior to procedure a time out was called to verify the correct patient, procedure, equipment, support staff and site/side marked as required   Supporting Documentation  Indications: pain   Procedure Details  Location: elbow - R elbow  Preparation: Patient was prepped and draped in the usual sterile fashion  Needle size: 23 G  Medications administered: 40 mg triamcinolone acetonide 40 MG/ML; 1 mL lidocaine 1 %  Patient tolerance: patient tolerated the procedure well with no immediate complications    Medium Joint Arthrocentesis: L elbow  Date/Time: 12/28/2022 8:58 AM  Consent given by: patient  Site marked: site marked  Timeout: Immediately prior to procedure a time out was called to verify the correct patient, procedure, equipment, support staff and site/side marked as required   Supporting Documentation  Indications: pain   Procedure Details  Location: elbow - L elbow  Preparation: Patient was prepped and draped in the usual sterile fashion  Needle size: 23 G  Medications administered: 40 mg triamcinolone acetonide 40 MG/ML; 1 mL lidocaine 1 %  Patient tolerance: patient tolerated the procedure well with no immediate complications              Imaging Results (Most Recent)     None           Result Review :          Assessment and Plan     Diagnoses and all orders for this visit:    1. Lateral epicondylitis of both elbows (Primary)        Discussed the treatment plan with the patient. Discussed conservative measures as exercises, anti-inflammatory and injection.  She tolerated bilateral steroid injections to the elbows.  She tolerated injection well.     Call or return if worsening symptoms.    Follow Up     PRN      Patient was given instructions and counseling regarding her condition or for health maintenance advice. Please see specific information pulled into the AVS if appropriate.     Scribed for Paolo Land MD by Emelia Vaughan MA.  12/28/22   08:33 EST    I have personally performed the services described in this document as scribed by the above individual and it is both accurate and complete. Paolo Land MD 12/28/22

## 2023-06-13 ENCOUNTER — TRANSCRIBE ORDERS (OUTPATIENT)
Dept: PHYSICAL THERAPY | Facility: CLINIC | Age: 53
End: 2023-06-13
Payer: COMMERCIAL

## 2023-06-13 DIAGNOSIS — M77.12 LEFT LATERAL EPICONDYLITIS: Primary | ICD-10-CM

## 2023-12-05 ENCOUNTER — TRANSCRIBE ORDERS (OUTPATIENT)
Dept: ADMINISTRATIVE | Facility: HOSPITAL | Age: 53
End: 2023-12-05
Payer: COMMERCIAL

## 2023-12-05 DIAGNOSIS — Z12.31 SCREENING MAMMOGRAM FOR HIGH-RISK PATIENT: Primary | ICD-10-CM

## 2024-02-07 ENCOUNTER — TREATMENT (OUTPATIENT)
Dept: PHYSICAL THERAPY | Facility: CLINIC | Age: 54
End: 2024-02-07
Payer: COMMERCIAL

## 2024-02-07 ENCOUNTER — TRANSCRIBE ORDERS (OUTPATIENT)
Dept: PHYSICAL THERAPY | Facility: CLINIC | Age: 54
End: 2024-02-07
Payer: COMMERCIAL

## 2024-02-07 DIAGNOSIS — M77.12 EPICONDYLITIS, LATERAL, LEFT: Primary | ICD-10-CM

## 2024-02-07 DIAGNOSIS — R20.2 TINGLING OF LEFT UPPER EXTREMITY: ICD-10-CM

## 2024-02-07 DIAGNOSIS — M25.622 DECREASED ROM OF LEFT ELBOW: ICD-10-CM

## 2024-02-07 DIAGNOSIS — M25.522 LEFT ELBOW PAIN: ICD-10-CM

## 2024-02-07 DIAGNOSIS — M77.12 LEFT LATERAL EPICONDYLITIS: Primary | ICD-10-CM

## 2024-02-07 PROCEDURE — 97165 OT EVAL LOW COMPLEX 30 MIN: CPT | Performed by: OCCUPATIONAL THERAPIST

## 2024-02-07 PROCEDURE — 97110 THERAPEUTIC EXERCISES: CPT | Performed by: OCCUPATIONAL THERAPIST

## 2024-02-07 NOTE — PROGRESS NOTES
Occupational Therapy Initial Evaluation and Plan of Care  [place]  OT: [address]  [place] [address]    Patient: Nani Shultz   : 1970  Diagnosis/ICD-10 Code:  Left lateral epicondylitis [M77.12]  Referring practitioner: RODOLFO Lala  Date of Initial Visit: 2024  Today's Date: 2024  Patient seen for 1 sessions           Subjective Questionnaire: QuickDASH: 51/55      Subjective Evaluation    History of Present Illness  Date of surgery: 2024  Mechanism of injury: Pt is a RHD 52 y/o female who presents to therapy s/p LUCL reconstruction and common extensor tendon repair. Pt reports approximate 14 year history of L elbow pain. Pt reports 10 years ago she stopped working as a  secondary to pain. Pt attempted conservative treatment (injections/HEP) but was unsuccessful. Pt elected to undergo surgery on . Pt was placed in a cast until . Pt was then placed in a hinged elbow brace. 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  PMHx: high cholesterol, heat stroke, anxiety, depression, HTN.      Pain  Current pain rating: 3  At best pain ratin  At worst pain rating: 10  Location: L elbow  Quality: sharp (stabbing)  Relieving factors: rest, relaxation and support  Aggravating factors: lifting and movement (Attempting to open jars)  Progression: no change             Objective          Observations     Additional Elbow Observation Details  Pt in hinged elbow brace. Incisions covered with bandages.    Neurological Testing     Additional Neurological Details  Pt reports no current numbness/tingling but reports tingling in L UE when unsupported.    Active Range of Motion   Left Shoulder   Flexion: 105 degrees with pain  Abduction: 75 degrees with pain  External rotation 0°: 5 degrees     Right Elbow   Flexion: 100 degrees   Extension: -45 degrees     Right Wrist   Wrist flexion: 70 degrees   Wrist extension: 45 degrees with pain  Radial deviation: WFL  Ulnar deviation: WFL    Additional  Active Range of Motion Details  Hinged elbow brace set at -    Strength/Myotome Testing     Additional Strength Details  Deferred.  Deferred.          Assessment & Plan       Assessment  Impairments: abnormal or restricted ROM, activity intolerance, impaired physical strength, lacks appropriate home exercise program and pain with function   Functional limitations: carrying objects, lifting, sleeping, pulling, pushing, uncomfortable because of pain, reaching behind back and reaching overhead   Assessment details: Pt will benefit from skilled OT secondary to decreased strength/ROM and increased pain that limits pt ability to complete ADLs.  Prognosis: good    Goals  Plan Goals: Patient complains of pain in L elbow.  LTG 1  12 weeks:  Decrease L elbow pain to 1/10 to improve ADL status.  Status:  New  STG 1  8 weeks:  Decrease L elbow pain to 2/10.  Status:  New    2.  Patient displays decreased L elbow and forearm AROM.  LTG 2  12 weeks:  Increase L elbow AROM to 0/140 and forearm pro/sup to 90/90 degrees to improve ability to perform ADL's.  Status:  New  STG 2  8 weeks:  Increase L elbow AROM to -10/130 and forearm pro/sup to 60/60 degrees.  Status:  New    3.  Patient displays decreased L arm and hand strength  LTG 3  12 weeks:  Increase L elbow strength to 5/5 MMT and  strength to TBA lbs to improve ability to reach, lift, carry and handle objects.  Status:  New  STG 3  8 weeks:  Patient will display good tolerance to strength testing.  Status:  New    4.  Carrying, moving, and handling objects functional limitation.  LTG 4  12 weeks:  The patient will demonstrate 1-19 % limitation by achieving a score of 19/55 on the Quick DASH.  Status: New  STG 4  8 weeks:  The patient will demonstrate 40-59 % limitation by achieving a score of 36/55 on the Quick DASH.  Status:  New       Plan  Planned modality interventions: cryotherapy and thermotherapy (hydrocollator packs)  Planned therapy interventions: body  mechanics training, fine motor coordination training, flexibility, functional ROM exercises, home exercise program, joint mobilization, manual therapy, neuromuscular re-education, postural training, soft tissue mobilization, strengthening, stretching and therapeutic activities  Frequency: 2x week  Duration in weeks: 12  Treatment plan discussed with: patient      Pt is indicated for skilled occupational therapy services.  Timed:           Therapeutic Exercise:    8     mins  [id number];       Un-Timed:  Low Eval     42     Mins  [id number]    Timed Treatment:   8   mins   Total Treatment:     50   mins    OT SIGNATURE: Billy Almaguer OT   DATE TREATMENT INITIATED: 2/7/2024  [place] License: [id number]    Initial Certification  Certification Period: 5/6/2024  I certify that the therapy services are furnished while this patient is under my care.  The services outlined above are required by this patient, and will be reviewed every 90 days.     PHYSICIAN: Levar Verdugo, APRN      DATE:   MD NPI: [id number]  Please sign and return via fax to   [phone].. Thank you, [facility].

## 2024-02-14 ENCOUNTER — TREATMENT (OUTPATIENT)
Dept: PHYSICAL THERAPY | Facility: CLINIC | Age: 54
End: 2024-02-14
Payer: COMMERCIAL

## 2024-02-14 DIAGNOSIS — M77.12 LEFT LATERAL EPICONDYLITIS: Primary | ICD-10-CM

## 2024-02-14 DIAGNOSIS — R20.2 TINGLING OF LEFT UPPER EXTREMITY: ICD-10-CM

## 2024-02-14 DIAGNOSIS — M25.622 DECREASED ROM OF LEFT ELBOW: ICD-10-CM

## 2024-02-14 DIAGNOSIS — M25.522 LEFT ELBOW PAIN: ICD-10-CM

## 2024-02-14 PROCEDURE — 97140 MANUAL THERAPY 1/> REGIONS: CPT | Performed by: OCCUPATIONAL THERAPIST

## 2024-02-14 PROCEDURE — 97110 THERAPEUTIC EXERCISES: CPT | Performed by: OCCUPATIONAL THERAPIST

## 2024-02-21 ENCOUNTER — TREATMENT (OUTPATIENT)
Dept: PHYSICAL THERAPY | Facility: CLINIC | Age: 54
End: 2024-02-21
Payer: COMMERCIAL

## 2024-02-21 DIAGNOSIS — M77.12 LEFT LATERAL EPICONDYLITIS: Primary | ICD-10-CM

## 2024-02-21 DIAGNOSIS — R20.2 TINGLING OF LEFT UPPER EXTREMITY: ICD-10-CM

## 2024-02-21 DIAGNOSIS — M25.622 DECREASED ROM OF LEFT ELBOW: ICD-10-CM

## 2024-02-21 DIAGNOSIS — M25.522 LEFT ELBOW PAIN: ICD-10-CM

## 2024-02-21 PROCEDURE — 97140 MANUAL THERAPY 1/> REGIONS: CPT | Performed by: OCCUPATIONAL THERAPIST

## 2024-02-21 PROCEDURE — 97110 THERAPEUTIC EXERCISES: CPT | Performed by: OCCUPATIONAL THERAPIST

## 2024-02-21 NOTE — LETTER
[place] OT: [address] [facility] [address] [place] [address]      Patient: Nani Shultz   : 1970  Diagnosis/ICD-10 Code:  Left lateral epicondylitis [M77.12]  Referring practitioner: RODOLFO Lala  Date of Initial Visit: Type: THERAPY  Noted: 2024  Today's Date: 2024  Patient seen for 3 sessions    Pt has been tolerating treatment well. Treatment has focused on shoulder pendulums, elbow AROM within limits of brace, wrist and hand AROM. Pt does report soreness in medial elbow at the end of every day. Shoulder ROM is limited secondary to increased pain since surgery. No other concerns at this time. Please advise after follow up. Thank you.    Objective          Observations     Additional Elbow Observation Details  Pt in hinged elbow brace. Incisions covered with bandages.    Neurological Testing     Additional Neurological Details  Pt reports no current numbness/tingling but reports tingling in L UE when unsupported.    Active Range of Motion   Left Shoulder   Flexion: 85 degrees with pain  Abduction: 103 degrees with pain  External rotation 0°: 5 degrees     Right Wrist   Wrist flexion: 70 degrees   Wrist extension: 45 degrees   Radial deviation: WFL  Ulnar deviation: WFL    Additional Active Range of Motion Details  Hinged elbow brace set at -    Strength/Myotome Testing     Right Wrist/Hand      (2nd hand position)     Trial 1: 61 lbs    Trial 2: 60 lbs    Trial 3: 61 lbs    Average: 60.67 lbs    Additional Strength Details  Deferred.  Deferred.

## 2024-02-21 NOTE — PROGRESS NOTES
Occupational Therapy Daily Treatment Note  Royce OT: [address] [place] [address]      Patient: Nani Shultz   : 1970  Diagnosis/ICD-10 Code:  Left lateral epicondylitis [M77.12]  Referring practitioner: RODOLFO Lala  Date of Initial Visit: Type: THERAPY  Noted: 2024  Today's Date: 2024  Patient seen for 3 sessions             Subjective   Nani Shultz reports: No current pain. Pt reports at the end of every day she experiences soreness in medial elbow.    Objective          Observations     Additional Elbow Observation Details  Pt in hinged elbow brace. Incisions covered with bandages.    Neurological Testing     Additional Neurological Details  Pt reports no current numbness/tingling but reports tingling in L UE when unsupported.    Active Range of Motion   Left Shoulder   Flexion: 85 degrees with pain  Abduction: 103 degrees with pain  External rotation 0°: 5 degrees     Right Wrist   Wrist flexion: 70 degrees   Wrist extension: 45 degrees   Radial deviation: WFL  Ulnar deviation: WFL    Additional Active Range of Motion Details  Hinged elbow brace set at -    Strength/Myotome Testing     Right Wrist/Hand      (2nd hand position)     Trial 1: 61 lbs    Trial 2: 60 lbs    Trial 3: 61 lbs    Average: 60.67 lbs    Additional Strength Details  Deferred.  Deferred.        See Exercise, Manual, and Modality Logs for complete treatment.       Assessment/Plan  Elbow brace opened up to 120 flexion and -10 extension. Pt tolerated treatment with no c/o increased pain. Pt to return to MD. Shoulder flexion decreased but shoulder abd increased. Shoulder ROM still limited by pain. Decreased pain with wrist ext.  Progress per Plan of Care           Timed:  Manual Therapy:    8     mins  00052;  Therapeutic Exercise:    19     mins  40488;         Timed Treatment:   27   mins   Total Treatment:     27   mins        Billy Almaguer OT  Occupational Therapist  [place] License: [id number]  NPI:  [id number]

## 2024-02-28 ENCOUNTER — TRANSCRIBE ORDERS (OUTPATIENT)
Dept: PHYSICAL THERAPY | Facility: CLINIC | Age: 54
End: 2024-02-28
Payer: COMMERCIAL

## 2024-02-28 ENCOUNTER — TREATMENT (OUTPATIENT)
Dept: PHYSICAL THERAPY | Facility: CLINIC | Age: 54
End: 2024-02-28
Payer: COMMERCIAL

## 2024-02-28 DIAGNOSIS — R20.2 TINGLING OF LEFT UPPER EXTREMITY: ICD-10-CM

## 2024-02-28 DIAGNOSIS — M25.522 LEFT ELBOW PAIN: ICD-10-CM

## 2024-02-28 DIAGNOSIS — M77.12 LEFT LATERAL EPICONDYLITIS: Primary | ICD-10-CM

## 2024-02-28 DIAGNOSIS — M25.622 DECREASED ROM OF LEFT ELBOW: ICD-10-CM

## 2024-02-28 PROCEDURE — 97530 THERAPEUTIC ACTIVITIES: CPT | Performed by: OCCUPATIONAL THERAPIST

## 2024-02-28 PROCEDURE — 97140 MANUAL THERAPY 1/> REGIONS: CPT | Performed by: OCCUPATIONAL THERAPIST

## 2024-02-28 PROCEDURE — 97110 THERAPEUTIC EXERCISES: CPT | Performed by: OCCUPATIONAL THERAPIST

## 2024-02-28 NOTE — PROGRESS NOTES
Occupational Therapy Daily Treatment Note  Royce OT: [address] [place] [address]      Patient: Nani Shultz   : 1970  Diagnosis/ICD-10 Code:  Left lateral epicondylitis [M77.12]  Referring practitioner: RODOLFO Lala  Date of Initial Visit: Type: THERAPY  Noted: 2024  Today's Date: 2024  Patient seen for 4 sessions             Subjective   Nani Shultz reports: No current pain in L elbow. Pt reports 5/10 pain L shoulder. MD order for therapy on shoulder.    Objective   Hinged elbow brace has been discontinued.  See Exercise, Manual, and Modality Logs for complete treatment.       Assessment/Plan  OT provided sidelying abd and supine flx HEP. Teachback successful. Good tolerance to treatment this date.  Progress per Plan of Care           Timed:  Manual Therapy:    8     mins  98526;  Therapeutic Exercise:    14     mins  23901;     Therapeutic Activity:     8     mins  28479;       Timed Treatment:   30   mins   Total Treatment:     30   mins        Billy Almaguer OT  Occupational Therapist  [place] License: [id number]  NPI: [id number]

## 2024-03-04 ENCOUNTER — TREATMENT (OUTPATIENT)
Dept: PHYSICAL THERAPY | Facility: CLINIC | Age: 54
End: 2024-03-04
Payer: COMMERCIAL

## 2024-03-04 DIAGNOSIS — M25.522 LEFT ELBOW PAIN: ICD-10-CM

## 2024-03-04 DIAGNOSIS — M77.12 LEFT LATERAL EPICONDYLITIS: Primary | ICD-10-CM

## 2024-03-04 DIAGNOSIS — R20.2 TINGLING OF LEFT UPPER EXTREMITY: ICD-10-CM

## 2024-03-04 DIAGNOSIS — M25.622 DECREASED ROM OF LEFT ELBOW: ICD-10-CM

## 2024-03-04 PROCEDURE — 97110 THERAPEUTIC EXERCISES: CPT | Performed by: OCCUPATIONAL THERAPIST

## 2024-03-04 PROCEDURE — 97140 MANUAL THERAPY 1/> REGIONS: CPT | Performed by: OCCUPATIONAL THERAPIST

## 2024-03-04 PROCEDURE — 97530 THERAPEUTIC ACTIVITIES: CPT | Performed by: OCCUPATIONAL THERAPIST

## 2024-03-04 NOTE — PROGRESS NOTES
Occupational Therapy Daily Treatment Note  Royce OT: [address] [place] [address]      Patient: Nani Shultz   : 1970  Diagnosis/ICD-10 Code:  Left lateral epicondylitis [M77.12]  Referring practitioner: RODOLFO Lala  Date of Initial Visit: Type: THERAPY  Noted: 2024  Today's Date: 3/4/2024  Patient seen for 5 sessions             Subjective   Nani Shultz reports: 6/10 pain L shoulder. No current pain L elbow. Pt reports she kept her shoulder tense for a lot    Objective     See Exercise, Manual, and Modality Logs for complete treatment.       Assessment/Plan  Gentle PROM to the shoulder is limited by pain although some stiffness noted. Pt tolerated elbow exercises well but continues with pain in the shoulder. OT provided verbal cues for proper body mechanics during shoulder alphabet. OT provided neck stretch HEP and discussed using heat on shoulder. Teachback successful.  Progress per Plan of Care           Timed:  Manual Therapy:    8     mins  02304;  Therapeutic Exercise:    15     mins  58879;     Neuromuscular Phuc:    2    mins  26852;    Therapeutic Activity:     13     mins  51050;       Timed Treatment:   38   mins   Total Treatment:     38   mins        Billy Almaguer OT  Occupational Therapist  [place] License: [id number]  NPI: [id number]

## 2024-03-06 ENCOUNTER — TREATMENT (OUTPATIENT)
Dept: PHYSICAL THERAPY | Facility: CLINIC | Age: 54
End: 2024-03-06
Payer: COMMERCIAL

## 2024-03-06 DIAGNOSIS — M25.522 LEFT ELBOW PAIN: ICD-10-CM

## 2024-03-06 DIAGNOSIS — M77.12 LEFT LATERAL EPICONDYLITIS: Primary | ICD-10-CM

## 2024-03-06 DIAGNOSIS — M25.622 DECREASED ROM OF LEFT ELBOW: ICD-10-CM

## 2024-03-06 DIAGNOSIS — R20.2 TINGLING OF LEFT UPPER EXTREMITY: ICD-10-CM

## 2024-03-06 PROCEDURE — 97140 MANUAL THERAPY 1/> REGIONS: CPT | Performed by: OCCUPATIONAL THERAPIST

## 2024-03-06 PROCEDURE — 97110 THERAPEUTIC EXERCISES: CPT | Performed by: OCCUPATIONAL THERAPIST

## 2024-03-06 PROCEDURE — 97530 THERAPEUTIC ACTIVITIES: CPT | Performed by: OCCUPATIONAL THERAPIST

## 2024-03-06 NOTE — PROGRESS NOTES
DOES USE FLONASE TWICE A MONTH, USED IT 2 WEEKS AGO. OT Re-evaluation/Progress Note  Royce  OT: [address]  [place] [address]    Patient: Nani Shultz   : 1970  Diagnosis/ICD-10 Code:  Left lateral epicondylitis [M77.12]  Referring practitioner: RODOLFO Lala  Date of Initial Visit: Type: THERAPY  Noted: 2024  Today's Date: 3/6/2024  Patient seen for 6 sessions      Subjective:   Subjective Questionnaire: QuickDASH: 40/55  Clinical Progress: improved  Home Program Compliance: Yes  Treatment has included: therapeutic exercise, neuromuscular re-education, manual therapy, and therapeutic activity    Subjective Evaluation    Pain  Current pain rating: 3  Location: L shoulder         Objective          Observations     Additional Elbow Observation Details  Well healed incisions to lateral elbow.    Neurological Testing     Additional Neurological Details  Pt reports numbness at incision.    Active Range of Motion   Left Shoulder   Flexion: 97 degrees   Abduction: 61 degrees   External rotation 0°: 40 degrees with pain    Left Elbow   Flexion: 120 degrees   Extension: -30 degrees   Forearm supination: WFL  Forearm pronation: WFL    Left Wrist   Wrist flexion: 75 degrees   Wrist extension: 65 degrees   Radial deviation: WFL  Ulnar deviation: WFL      Strength/Myotome Testing     Right Wrist/Hand      (2nd hand position)     Trial 1: 61 lbs    Trial 2: 60 lbs    Trial 3: 61 lbs    Average: 60.67 lbs    Additional Strength Details  Deferred.  Deferred.      Assessment & Plan       Assessment  Impairments: abnormal or restricted ROM, activity intolerance, impaired physical strength, lacks appropriate home exercise program and pain with function   Functional limitations: carrying objects, lifting, sleeping, pulling, pushing, uncomfortable because of pain, reaching behind back and reaching overhead   Assessment details: Pt displays improved AROM in L elbow/wrist and reports decreased pain in elbow. Pt has developed pain/stiffness in L shoulder  that is functionally limiting. Pt also continues with decreased fxl strength/ROM in L elbow that limits ability to complete ADLs/IADLs. Pt met 1/4 STGs  Prognosis: good    Goals  Plan Goals: Patient complains of pain in L elbow.  LTG 1  12 weeks:  Decrease L elbow pain to 1/10 to improve ADL status.  Status:  New  STG 1  8 weeks:  Decrease L elbow pain to 2/10.  Status:  Met    2.  Patient displays decreased L elbow and forearm AROM.  LTG 2  12 weeks:  Increase L elbow AROM to 0/140 and forearm pro/sup to 90/90 degrees to improve ability to perform ADL's.  Status:  New  STG 2  8 weeks:  Increase L elbow AROM to -10/130 and forearm pro/sup to 60/60 degrees.  Status:  New    3.  Patient displays decreased L arm and hand strength  LTG 3  12 weeks:  Increase L elbow strength to 5/5 MMT and  strength to TBA lbs to improve ability to reach, lift, carry and handle objects.  Status:  New  STG 3  8 weeks:  Patient will display good tolerance to strength testing.  Status:  New    4.  Carrying, moving, and handling objects functional limitation.  LTG 4  12 weeks:  The patient will demonstrate 1-19 % limitation by achieving a score of 19/55 on the Quick DASH.  Status: New  STG 4  8 weeks:  The patient will demonstrate 40-59 % limitation by achieving a score of 36/55 on the Quick DASH.  Status:  New       Plan  Planned modality interventions: cryotherapy and thermotherapy (hydrocollator packs)  Planned therapy interventions: body mechanics training, fine motor coordination training, flexibility, functional ROM exercises, home exercise program, joint mobilization, manual therapy, neuromuscular re-education, postural training, soft tissue mobilization, strengthening, stretching and therapeutic activities  Frequency: 2x week  Duration in weeks: 12  Treatment plan discussed with: patient      Progress toward previous goals: Partially Met      Recommendations: Continue as planned  Timeframe: 2 months  Prognosis to achieve goals:  good    OT SIGNATURE: Billy Almaguer OT   DATE TREATMENT INITIATED: Type: THERAPY  Noted: 2/7/2024  [place] License: [id number]    Based upon review of the patient's progress and continued therapy plan, it is my medical opinion that Nani Shultz should continue occupational therapy treatment at [facility] PHYS[facility] THERAP[facility]  [address] [place] [address] [place] [address] [place] [id number] [phone].    Signature: __________________________________  Levar Verdugo APRN MD NPI: [id number]  Timed:  Manual Therapy:    14     mins  97711;  Therapeutic Exercise:    8     mins  66669;      Therapeutic Activity:     8     mins  73925;       Timed Treatment:   30   mins   Total Treatment:     38   mins  Please sign and return via fax to [phone]  . Thank you, [facility] Occupational Therapy.

## 2024-03-11 ENCOUNTER — TREATMENT (OUTPATIENT)
Dept: PHYSICAL THERAPY | Facility: CLINIC | Age: 54
End: 2024-03-11
Payer: COMMERCIAL

## 2024-03-11 DIAGNOSIS — M77.12 LEFT LATERAL EPICONDYLITIS: Primary | ICD-10-CM

## 2024-03-11 DIAGNOSIS — M25.522 LEFT ELBOW PAIN: ICD-10-CM

## 2024-03-11 DIAGNOSIS — M25.622 DECREASED ROM OF LEFT ELBOW: ICD-10-CM

## 2024-03-11 DIAGNOSIS — R20.2 TINGLING OF LEFT UPPER EXTREMITY: ICD-10-CM

## 2024-03-11 PROCEDURE — 97140 MANUAL THERAPY 1/> REGIONS: CPT | Performed by: OCCUPATIONAL THERAPIST

## 2024-03-11 PROCEDURE — 97110 THERAPEUTIC EXERCISES: CPT | Performed by: OCCUPATIONAL THERAPIST

## 2024-03-11 PROCEDURE — 97530 THERAPEUTIC ACTIVITIES: CPT | Performed by: OCCUPATIONAL THERAPIST

## 2024-03-11 NOTE — PROGRESS NOTES
Occupational Therapy Daily Treatment Note  Royce OT: [address] [place] [address]      Patient: Nani Shultz   : 1970  Diagnosis/ICD-10 Code:  Left lateral epicondylitis [M77.12]  Referring practitioner: RODOLFO Lala  Date of Initial Visit: Type: THERAPY  Noted: 2024  Today's Date: 3/11/2024  Patient seen for 7 sessions             Subjective   Nani Shultz reports: 8/10 pain L shoulder. 3/10 pain L elbow. Pt reports she almost went to the hospital for her shoulder over the weekend.    Objective     See Exercise, Manual, and Modality Logs for complete treatment.       Assessment/Plan  Shoulder pain is radiating down into elbow and is limiting participation in therapy at this point. Pt to call and notify MD. OT graded down AA flexion to ball roll. Pt reports improved tolerance.  Progress per Plan of Care           Timed:  Manual Therapy:    10     mins  61507;  Therapeutic Exercise:    10     mins  75575;      Therapeutic Activity:     15     mins  48498;       Timed Treatment:   35   mins   Total Treatment:     35   mins        Billy Almaguer OT  Occupational Therapist  [place] License: [id number]  NPI: [id number]

## 2024-03-13 ENCOUNTER — TREATMENT (OUTPATIENT)
Dept: PHYSICAL THERAPY | Facility: CLINIC | Age: 54
End: 2024-03-13
Payer: COMMERCIAL

## 2024-03-13 DIAGNOSIS — M25.622 DECREASED ROM OF LEFT ELBOW: ICD-10-CM

## 2024-03-13 DIAGNOSIS — M77.12 LEFT LATERAL EPICONDYLITIS: Primary | ICD-10-CM

## 2024-03-13 DIAGNOSIS — M25.522 LEFT ELBOW PAIN: ICD-10-CM

## 2024-03-13 DIAGNOSIS — R20.2 TINGLING OF LEFT UPPER EXTREMITY: ICD-10-CM

## 2024-03-13 PROCEDURE — 97530 THERAPEUTIC ACTIVITIES: CPT | Performed by: OCCUPATIONAL THERAPIST

## 2024-03-13 PROCEDURE — 97140 MANUAL THERAPY 1/> REGIONS: CPT | Performed by: OCCUPATIONAL THERAPIST

## 2024-03-13 PROCEDURE — 97110 THERAPEUTIC EXERCISES: CPT | Performed by: OCCUPATIONAL THERAPIST

## 2024-03-13 NOTE — PROGRESS NOTES
Occupational Therapy Daily Treatment Note  Royce OT: [address] [place] [address]      Patient: Nani Shultz   : 1970  Diagnosis/ICD-10 Code:  Left lateral epicondylitis [M77.12]  Referring practitioner: RODOLFO Lala  Date of Initial Visit: Type: THERAPY  Noted: 2024  Today's Date: 3/13/2024  Patient seen for 8 sessions             Subjective   Nani Shultz reports: 3-4/10 pain L shoulder. Pt reports on Monday her shoulder popped and relieved most of her pain. No current pain in the elbow.    Objective     See Exercise, Manual, and Modality Logs for complete treatment.       Assessment/Plan  Pt tolerated treatment better today but continues with pain in the shoulder that is limiting.   Progress per Plan of Care           Timed:  Manual Therapy:    10     mins  13430;  Therapeutic Exercise:    10     mins  34741;       Therapeutic Activity:     13     mins  57636;       Timed Treatment:   33   mins   Total Treatment:     33   mins        Billy Almaguer OT  Occupational Therapist  [place] License: [id number]  NPI: [id number]

## 2024-03-18 ENCOUNTER — TREATMENT (OUTPATIENT)
Dept: PHYSICAL THERAPY | Facility: CLINIC | Age: 54
End: 2024-03-18
Payer: COMMERCIAL

## 2024-03-18 DIAGNOSIS — M25.522 LEFT ELBOW PAIN: ICD-10-CM

## 2024-03-18 DIAGNOSIS — M77.12 LEFT LATERAL EPICONDYLITIS: Primary | ICD-10-CM

## 2024-03-18 DIAGNOSIS — M25.622 DECREASED ROM OF LEFT ELBOW: ICD-10-CM

## 2024-03-18 DIAGNOSIS — R20.2 TINGLING OF LEFT UPPER EXTREMITY: ICD-10-CM

## 2024-03-18 PROCEDURE — 97530 THERAPEUTIC ACTIVITIES: CPT | Performed by: OCCUPATIONAL THERAPIST

## 2024-03-18 PROCEDURE — 97140 MANUAL THERAPY 1/> REGIONS: CPT | Performed by: OCCUPATIONAL THERAPIST

## 2024-03-18 NOTE — PROGRESS NOTES
Occupational Therapy Daily Treatment Note  Royce OT: [address] [place] [address]      Patient: Nani Shultz   : 1970  Diagnosis/ICD-10 Code:  Left lateral epicondylitis [M77.12]  Referring practitioner: RODOLFO Lala  Date of Initial Visit: Type: THERAPY  Noted: 2024  Today's Date: 3/18/2024  Patient seen for 9 sessions             Subjective   Nani Shultz reports: 4/10 pain L shoulder.    Objective     See Exercise, Manual, and Modality Logs for complete treatment.       Assessment/Plan  Pt reports no pain in elbow with exercises but continues with L shoulder pain that is limiting.  Progress per Plan of Care           Timed:  Manual Therapy:    10     mins  20542;  Therapeutic Exercise:    6     mins  09843;     Therapeutic Activity:     8     mins  73592;       Timed Treatment:   24   mins   Total Treatment:     39   mins        Billy Almaguer OT  Occupational Therapist  [place] License: [id number]  NPI: [id number]

## 2024-03-20 ENCOUNTER — TREATMENT (OUTPATIENT)
Dept: PHYSICAL THERAPY | Facility: CLINIC | Age: 54
End: 2024-03-20
Payer: COMMERCIAL

## 2024-03-20 DIAGNOSIS — M25.522 LEFT ELBOW PAIN: ICD-10-CM

## 2024-03-20 DIAGNOSIS — R20.2 TINGLING OF LEFT UPPER EXTREMITY: ICD-10-CM

## 2024-03-20 DIAGNOSIS — M25.622 DECREASED ROM OF LEFT ELBOW: ICD-10-CM

## 2024-03-20 DIAGNOSIS — M77.12 LEFT LATERAL EPICONDYLITIS: Primary | ICD-10-CM

## 2024-03-20 PROCEDURE — 97530 THERAPEUTIC ACTIVITIES: CPT | Performed by: OCCUPATIONAL THERAPIST

## 2024-03-20 PROCEDURE — 97110 THERAPEUTIC EXERCISES: CPT | Performed by: OCCUPATIONAL THERAPIST

## 2024-03-20 PROCEDURE — 97140 MANUAL THERAPY 1/> REGIONS: CPT | Performed by: OCCUPATIONAL THERAPIST

## 2024-03-20 NOTE — PROGRESS NOTES
Occupational Therapy Daily Treatment Note  Royce OT: [address] [place] [address]      Patient: Nani Shultz   : 1970  Diagnosis/ICD-10 Code:  Left lateral epicondylitis [M77.12]  Referring practitioner: RODOLFO Lala  Date of Initial Visit: Type: THERAPY  Noted: 2024  Today's Date: 3/20/2024  Patient seen for 10 sessions             Subjective   Nani Shultz reports: No current pain in shoulder or elbow. Pt reports occasional pain in L shoulder with certain movements.    Objective     See Exercise, Manual, and Modality Logs for complete treatment.       Assessment/Plan  Pt reports improved AA flexion this date. Good tolerance to elbow exercises. Shoulder exercises continue to be limited by pain.  Progress per Plan of Care           Timed:  Manual Therapy:    23     mins  79765;  Therapeutic Exercise:    15     mins  56402;     Therapeutic Activity:     12     mins  71475;       Timed Treatment:   50   mins   Total Treatment:     50   mins        Billy Almaguer OT  Occupational Therapist  [place] License: [id number]  NPI: [id number]

## 2024-03-25 ENCOUNTER — TREATMENT (OUTPATIENT)
Dept: PHYSICAL THERAPY | Facility: CLINIC | Age: 54
End: 2024-03-25
Payer: COMMERCIAL

## 2024-03-25 DIAGNOSIS — M77.12 LEFT LATERAL EPICONDYLITIS: Primary | ICD-10-CM

## 2024-03-25 DIAGNOSIS — R20.2 TINGLING OF LEFT UPPER EXTREMITY: ICD-10-CM

## 2024-03-25 DIAGNOSIS — M25.522 LEFT ELBOW PAIN: ICD-10-CM

## 2024-03-25 DIAGNOSIS — M25.622 DECREASED ROM OF LEFT ELBOW: ICD-10-CM

## 2024-03-25 PROCEDURE — 97530 THERAPEUTIC ACTIVITIES: CPT | Performed by: OCCUPATIONAL THERAPIST

## 2024-03-25 PROCEDURE — 97110 THERAPEUTIC EXERCISES: CPT | Performed by: OCCUPATIONAL THERAPIST

## 2024-03-25 PROCEDURE — 97140 MANUAL THERAPY 1/> REGIONS: CPT | Performed by: OCCUPATIONAL THERAPIST

## 2024-03-25 NOTE — PROGRESS NOTES
Occupational Therapy Daily Treatment Note  Royce OT: [address] [place] [address]      Patient: Nani Shultz   : 1970  Diagnosis/ICD-10 Code:  Left lateral epicondylitis [M77.12]  Referring practitioner: RODOLFO Lala  Date of Initial Visit: Type: THERAPY  Noted: 2024  Today's Date: 3/25/2024  Patient seen for 11 sessions             Subjective   Nani Shultz reports: No pain at rest. Pt continues with pain in L shoulder with use.    Objective     See Exercise, Manual, and Modality Logs for complete treatment.       Assessment/Plan  Pt continues to be severely limited by shoulder pain. Pt reports no pain in elbow with exercises. Pt tolerated gentle wrist and biceps strengthening well.  Progress per Plan of Care           Timed:  Manual Therapy:    13     mins  02230;  Therapeutic Exercise:    8     mins  71894;      Therapeutic Activity:     8     mins  26433;       Timed Treatment:   29   mins   Total Treatment:     40   mins        Billy Almaguer OT  Occupational Therapist  [place] License: [id number]  NPI: [id number]

## 2024-03-27 ENCOUNTER — TREATMENT (OUTPATIENT)
Dept: PHYSICAL THERAPY | Facility: CLINIC | Age: 54
End: 2024-03-27
Payer: COMMERCIAL

## 2024-03-27 DIAGNOSIS — M77.12 LEFT LATERAL EPICONDYLITIS: Primary | ICD-10-CM

## 2024-03-27 DIAGNOSIS — M25.522 LEFT ELBOW PAIN: ICD-10-CM

## 2024-03-27 DIAGNOSIS — R20.2 TINGLING OF LEFT UPPER EXTREMITY: ICD-10-CM

## 2024-03-27 DIAGNOSIS — M25.622 DECREASED ROM OF LEFT ELBOW: ICD-10-CM

## 2024-03-27 PROCEDURE — 97530 THERAPEUTIC ACTIVITIES: CPT | Performed by: OCCUPATIONAL THERAPIST

## 2024-03-27 PROCEDURE — 97110 THERAPEUTIC EXERCISES: CPT | Performed by: OCCUPATIONAL THERAPIST

## 2024-03-27 PROCEDURE — 97140 MANUAL THERAPY 1/> REGIONS: CPT | Performed by: OCCUPATIONAL THERAPIST

## 2024-03-27 NOTE — PROGRESS NOTES
Occupational Therapy Daily Treatment Note  Royce OT: [address] [place] [address]      Patient: Nani Shultz   : 1970  Diagnosis/ICD-10 Code:  Left lateral epicondylitis [M77.12]  Referring practitioner: RODOLFO Lala  Date of Initial Visit: Type: THERAPY  Noted: 2024  Today's Date: 3/27/2024  Patient seen for 12 sessions             Subjective   Nani Shultz reports: 3-4/10 pain L shoulder and 2/10 pain L elbow. Pt reports she was opening a jar yesterday and that aggravated her elbow.    Objective     See Exercise, Manual, and Modality Logs for complete treatment.       Assessment/Plan  Pt reports decreased tolerance to wall wash this date (shoulder). Pt tolerated remainder of treatment well. Pt continues with stiffness/pain in L shoulder that is most limiting.  Progress per Plan of Care           Timed:  Manual Therapy:    23     mins  13633;  Therapeutic Exercise:    8     mins  57389;      Therapeutic Activity:     11     mins  09304;       Timed Treatment:   42   mins   Total Treatment:     42   mins        Billy Almaguer OT  Occupational Therapist  [place] License: [id number]  NPI: [id number]

## 2024-04-01 ENCOUNTER — TREATMENT (OUTPATIENT)
Dept: PHYSICAL THERAPY | Facility: CLINIC | Age: 54
End: 2024-04-01
Payer: COMMERCIAL

## 2024-04-01 DIAGNOSIS — M25.622 DECREASED ROM OF LEFT ELBOW: ICD-10-CM

## 2024-04-01 DIAGNOSIS — M77.12 LEFT LATERAL EPICONDYLITIS: Primary | ICD-10-CM

## 2024-04-01 DIAGNOSIS — M25.522 LEFT ELBOW PAIN: ICD-10-CM

## 2024-04-01 DIAGNOSIS — R20.2 TINGLING OF LEFT UPPER EXTREMITY: ICD-10-CM

## 2024-04-01 PROCEDURE — 97140 MANUAL THERAPY 1/> REGIONS: CPT | Performed by: OCCUPATIONAL THERAPIST

## 2024-04-01 PROCEDURE — 97110 THERAPEUTIC EXERCISES: CPT | Performed by: OCCUPATIONAL THERAPIST

## 2024-04-01 PROCEDURE — 97530 THERAPEUTIC ACTIVITIES: CPT | Performed by: OCCUPATIONAL THERAPIST

## 2024-04-01 NOTE — PROGRESS NOTES
"Occupational Therapy Daily Treatment Note  Royce OT: [address] [place] [address]      Patient: Nani Shultz   : 1970  Diagnosis/ICD-10 Code:  Left lateral epicondylitis [M77.12]  Referring practitioner: RODOLFO Lala  Date of Initial Visit: Type: THERAPY  Noted: 2024  Today's Date: 2024  Patient seen for 13 sessions             Subjective   Nani Shultz reports: \"My shoulder went downhill over the weekend\" 6/10 pain L shoulder. No current pain in elbow. Pt reports while doing dishes Saturday she felt a sharp pain shoot through her shoulder causing her to     Objective     See Exercise, Manual, and Modality Logs for complete treatment.       Assessment/Plan  Pt reports elbow ROM is limited by shoulder pain. Pt also unable to complete full reps with resisted wrist flexion secondary to shoulder pain. OT encouraged pt to contact MD about ongoing shoulder pain. Pt verbalizes understanding. Pt reports no pain in elbow during treatment.  Progress per Plan of Care           Timed:  Manual Therapy:    23     mins  58675;  Therapeutic Exercise:    8     mins  16190;     Therapeutic Activity:     9     mins  79406;       Timed Treatment:   40   mins   Total Treatment:     40   mins        Billy Almaguer OT  Occupational Therapist  [place] License: [id number]  NPI: [id number]  "

## 2024-04-10 ENCOUNTER — TREATMENT (OUTPATIENT)
Dept: PHYSICAL THERAPY | Facility: CLINIC | Age: 54
End: 2024-04-10
Payer: COMMERCIAL

## 2024-04-10 DIAGNOSIS — M25.622 DECREASED ROM OF LEFT ELBOW: ICD-10-CM

## 2024-04-10 DIAGNOSIS — M77.12 LEFT LATERAL EPICONDYLITIS: Primary | ICD-10-CM

## 2024-04-10 DIAGNOSIS — M25.522 LEFT ELBOW PAIN: ICD-10-CM

## 2024-04-10 DIAGNOSIS — R20.2 TINGLING OF LEFT UPPER EXTREMITY: ICD-10-CM

## 2024-04-10 PROCEDURE — 97530 THERAPEUTIC ACTIVITIES: CPT | Performed by: OCCUPATIONAL THERAPIST

## 2024-04-10 PROCEDURE — 97110 THERAPEUTIC EXERCISES: CPT | Performed by: OCCUPATIONAL THERAPIST

## 2024-04-10 PROCEDURE — 97140 MANUAL THERAPY 1/> REGIONS: CPT | Performed by: OCCUPATIONAL THERAPIST

## 2024-04-10 NOTE — PROGRESS NOTES
OT Re-evaluation/Progress Note  Royce  OT: [address]  [place] [address]    Patient: Nani Shultz   : 1970  Diagnosis/ICD-10 Code:  Left lateral epicondylitis [M77.12]  Referring practitioner: RODOLFO Lala  Date of Initial Visit: Type: THERAPY  Noted: 2024  Today's Date: 4/10/2024  Patient seen for 14 sessions      Subjective:   Subjective Questionnaire: QuickDASH: 42/55  Clinical Progress: shoulder has regress; elbow has improved  Home Program Compliance: Yes  Treatment has included: therapeutic exercise, manual therapy, and therapeutic activity    Subjective Evaluation    Pain  Current pain ratin (0/10 pain L elbow)  Location: L shoulder       Objective          Observations     Additional Elbow Observation Details  Well healed incisions to lateral elbow.    Neurological Testing     Additional Neurological Details  Pt reports numbness at incision.    Active Range of Motion   Left Shoulder   Flexion: 70 degrees with pain  Abduction: 30 degrees with pain  External rotation 0°: -7 degrees with pain    Left Elbow   Flexion: 130 degrees   Extension: -20 degrees   Forearm supination: WFL  Forearm pronation: WFL    Left Wrist   Wrist flexion: WFL  Wrist extension: WFL  Radial deviation: WFL  Ulnar deviation: WFL      Passive Range of Motion   Left Shoulder   Flexion: 70 degrees with pain  Abduction: 50 degrees with pain  External rotation 0°: 25 degrees with pain    Strength/Myotome Testing     Left Elbow   Normal strength    Left Wrist/Hand   Normal wrist strength     (2nd hand position)     Trial 1: 42 lbs    Trial 2: 45 lbs    Trial 3: 42 lbs    Average: 43 lbs    Right Wrist/Hand      (2nd hand position)     Trial 1: 61 lbs    Trial 2: 60 lbs    Trial 3: 61 lbs    Average: 60.67 lbs    Additional Strength Details  Deferred.      Assessment & Plan       Assessment  Impairments: abnormal or restricted ROM, activity intolerance, impaired physical strength, lacks appropriate  home exercise program and pain with function   Functional limitations: carrying objects, lifting, sleeping, pulling, pushing, uncomfortable because of pain, reaching behind back and reaching overhead   Assessment details: Pt displays improved AROM/strength in L elbow/wrist. L shoulder has regressed. Pt continues with slightly decreased  strength and increased pain/stiffness in L shoulder that limits ability to complete ADLs/IADLs. Pt met 1/4 STGs  Prognosis: good    Goals  Plan Goals: Patient complains of pain in L elbow.  LTG 1  12 weeks:  Decrease L elbow pain to 1/10 to improve ADL status.  Status:  New  STG 1  8 weeks:  Decrease L elbow pain to 2/10.  Status:  Met    2.  Patient displays decreased L elbow and forearm AROM.  LTG 2  12 weeks:  Increase L elbow AROM to 0/140 and forearm pro/sup to 90/90 degrees to improve ability to perform ADL's.  Status:  New  STG 2  8 weeks:  Increase L elbow AROM to -10/130 and forearm pro/sup to 60/60 degrees.  Status:  New    3.  Patient displays decreased L arm and hand strength  LTG 3  12 weeks:  Increase L elbow strength to 5/5 MMT and  strength to TBA lbs to improve ability to reach, lift, carry and handle objects.  Status:  New  STG 3  8 weeks:  Patient will display good tolerance to strength testing.  Status:  New    4.  Carrying, moving, and handling objects functional limitation.  LTG 4  12 weeks:  The patient will demonstrate 1-19 % limitation by achieving a score of 19/55 on the Quick DASH.  Status: New  STG 4  8 weeks:  The patient will demonstrate 40-59 % limitation by achieving a score of 36/55 on the Quick DASH.  Status:  New       Plan  Planned modality interventions: cryotherapy and thermotherapy (hydrocollator packs)  Planned therapy interventions: body mechanics training, fine motor coordination training, flexibility, functional ROM exercises, home exercise program, joint mobilization, manual therapy, neuromuscular re-education, postural training,  soft tissue mobilization, strengthening, stretching and therapeutic activities  Frequency: 2x week  Duration in weeks: 12  Treatment plan discussed with: patient      Progress toward previous goals: Partially Met      Recommendations: Continue as planned  Timeframe: 6 weeks  Prognosis to achieve goals: good    OT SIGNATURE: Billy Almaguer OT   DATE TREATMENT INITIATED: Type: THERAPY  Noted: 2/7/2024  [place] License: [id number]    Based upon review of the patient's progress and continued therapy plan, it is my medical opinion that Nani Shultz should continue occupational therapy treatment at [facility] PH[facility] THERAP[facility]  [address] [place] [id number] [phone].    Signature: __________________________________  Levar Verdugo APRN MD NPI: [id number]  Timed:  Manual Therapy:    8     mins  93097;  Therapeutic Exercise:    25     mins  14338;      Therapeutic Activity:     10     mins  28269;       Timed Treatment:   43   mins   Total Treatment:     43   mins  Please sign and return via fax to [phone]  . Thank you, [facility] Occupational Therapy.

## 2024-04-15 ENCOUNTER — TREATMENT (OUTPATIENT)
Dept: PHYSICAL THERAPY | Facility: CLINIC | Age: 54
End: 2024-04-15
Payer: COMMERCIAL

## 2024-04-15 DIAGNOSIS — M77.12 LEFT LATERAL EPICONDYLITIS: Primary | ICD-10-CM

## 2024-04-15 DIAGNOSIS — R20.2 TINGLING OF LEFT UPPER EXTREMITY: ICD-10-CM

## 2024-04-15 DIAGNOSIS — M25.522 LEFT ELBOW PAIN: ICD-10-CM

## 2024-04-15 DIAGNOSIS — M25.622 DECREASED ROM OF LEFT ELBOW: ICD-10-CM

## 2024-04-15 PROCEDURE — 97110 THERAPEUTIC EXERCISES: CPT | Performed by: OCCUPATIONAL THERAPIST

## 2024-04-15 PROCEDURE — 97140 MANUAL THERAPY 1/> REGIONS: CPT | Performed by: OCCUPATIONAL THERAPIST

## 2024-04-15 PROCEDURE — 97530 THERAPEUTIC ACTIVITIES: CPT | Performed by: OCCUPATIONAL THERAPIST

## 2024-04-15 NOTE — PROGRESS NOTES
Occupational Therapy Daily Treatment Note  Royce OT: [address] [place] [address]      Patient: Nani Shultz   : 1970  Diagnosis/ICD-10 Code:  Left lateral epicondylitis [M77.12]  Referring practitioner: RODOLFO Lala  Date of Initial Visit: Type: THERAPY  Noted: 2024  Today's Date: 4/15/2024  Patient seen for 15 sessions             Subjective   Nani Shultz reports: 5/10 pain L shoulder. 3/10 pain L proximal anterior forearm. Pt reports forearm pain began yesterday.    Objective     See Exercise, Manual, and Modality Logs for complete treatment.       Assessment/Plan  OT encouraged pt to apply less force with elbow extension stretch and to only stretch into comfortable positioning. Pt verbalizes understanding. Shoulder pain continues to be most functionally limiting at this point.  Progress per Plan of Care           Timed:  Manual Therapy:    10     mins  99657;  Therapeutic Exercise:    8     mins  05480;      Therapeutic Activity:     22     mins  05302;       Timed Treatment:   40   mins   Total Treatment:     40   mins        Billy Almaguer OT  Occupational Therapist  [place] License: [id number]  NPI: [id number]

## 2024-04-22 ENCOUNTER — TREATMENT (OUTPATIENT)
Dept: PHYSICAL THERAPY | Facility: CLINIC | Age: 54
End: 2024-04-22
Payer: COMMERCIAL

## 2024-04-22 DIAGNOSIS — R20.2 TINGLING OF LEFT UPPER EXTREMITY: ICD-10-CM

## 2024-04-22 DIAGNOSIS — M25.522 LEFT ELBOW PAIN: ICD-10-CM

## 2024-04-22 DIAGNOSIS — M77.12 LEFT LATERAL EPICONDYLITIS: Primary | ICD-10-CM

## 2024-04-22 DIAGNOSIS — M25.622 DECREASED ROM OF LEFT ELBOW: ICD-10-CM

## 2024-04-22 PROCEDURE — 97140 MANUAL THERAPY 1/> REGIONS: CPT | Performed by: OCCUPATIONAL THERAPIST

## 2024-04-22 PROCEDURE — 97110 THERAPEUTIC EXERCISES: CPT | Performed by: OCCUPATIONAL THERAPIST

## 2024-04-22 PROCEDURE — 97530 THERAPEUTIC ACTIVITIES: CPT | Performed by: OCCUPATIONAL THERAPIST

## 2024-04-22 NOTE — PROGRESS NOTES
Occupational Therapy Daily Treatment Note  Royce OT: [address] [place] [address]      Patient: Nani Shultz   : 1970  Diagnosis/ICD-10 Code:  Left lateral epicondylitis [M77.12]  Referring practitioner: RODOLFO Lala  Date of Initial Visit: Type: THERAPY  Noted: 2024  Today's Date: 2024  Patient seen for 16 sessions             Subjective   Nani Shultz reports: 8/10 pain L shoulder. 3/10 pain anterior elbow.    Objective     See Exercise, Manual, and Modality Logs for complete treatment.       Assessment/Plan  Pt reports pain with AA wall wash (activity ended). Good tolerance to remainder of treatment.  Progress per Plan of Care           Timed:  Manual Therapy:    10     mins  77919;  Therapeutic Exercise:    9     mins  39989;     Therapeutic Activity:     23     mins  59231;       Timed Treatment:   42   mins   Total Treatment:     46   mins        Billy Almaguer OT  Occupational Therapist  [place] License: [id number]  NPI: [id number]

## 2024-04-24 ENCOUNTER — TREATMENT (OUTPATIENT)
Dept: PHYSICAL THERAPY | Facility: CLINIC | Age: 54
End: 2024-04-24
Payer: COMMERCIAL

## 2024-04-24 DIAGNOSIS — M25.522 LEFT ELBOW PAIN: ICD-10-CM

## 2024-04-24 DIAGNOSIS — R20.2 TINGLING OF LEFT UPPER EXTREMITY: ICD-10-CM

## 2024-04-24 DIAGNOSIS — M25.622 DECREASED ROM OF LEFT ELBOW: ICD-10-CM

## 2024-04-24 DIAGNOSIS — M77.12 LEFT LATERAL EPICONDYLITIS: Primary | ICD-10-CM

## 2024-04-24 PROCEDURE — 97110 THERAPEUTIC EXERCISES: CPT | Performed by: OCCUPATIONAL THERAPIST

## 2024-04-24 PROCEDURE — 97140 MANUAL THERAPY 1/> REGIONS: CPT | Performed by: OCCUPATIONAL THERAPIST

## 2024-04-24 PROCEDURE — 97530 THERAPEUTIC ACTIVITIES: CPT | Performed by: OCCUPATIONAL THERAPIST

## 2024-04-24 NOTE — PROGRESS NOTES
Occupational Therapy Daily Treatment Note  Royce OT: [address] [place] [address]      Patient: Nani Shultz   : 1970  Diagnosis/ICD-10 Code:  Left lateral epicondylitis [M77.12]  Referring practitioner: RODOLFO Lala  Date of Initial Visit: Type: THERAPY  Noted: 2024  Today's Date: 2024  Patient seen for 17 sessions             Subjective   Nani Shultz reports: No pain in elbow. 7/10 pain L shoulder.    Objective     See Exercise, Manual, and Modality Logs for complete treatment.       Assessment/Plan  Pt displays improved passive abd this date. Pt tolerated all exercises at the elbow well. Pt still limited by pain/stiffness in shoulder.  Progress per Plan of Care           Timed:  Manual Therapy:    23     mins  52347;  Therapeutic Exercise:    8     mins  79840;     Therapeutic Activity:     8     mins  47900;       Timed Treatment:   39   mins   Total Treatment:     39   mins        Billy Almaguer OT  Occupational Therapist  [place] License: [id number]  NPI: [id number]

## 2024-04-29 ENCOUNTER — TREATMENT (OUTPATIENT)
Dept: PHYSICAL THERAPY | Facility: CLINIC | Age: 54
End: 2024-04-29
Payer: COMMERCIAL

## 2024-04-29 DIAGNOSIS — R20.2 TINGLING OF LEFT UPPER EXTREMITY: ICD-10-CM

## 2024-04-29 DIAGNOSIS — M77.12 LEFT LATERAL EPICONDYLITIS: Primary | ICD-10-CM

## 2024-04-29 DIAGNOSIS — M25.522 LEFT ELBOW PAIN: ICD-10-CM

## 2024-04-29 DIAGNOSIS — M25.622 DECREASED ROM OF LEFT ELBOW: ICD-10-CM

## 2024-04-29 PROCEDURE — 97140 MANUAL THERAPY 1/> REGIONS: CPT | Performed by: OCCUPATIONAL THERAPIST

## 2024-04-29 PROCEDURE — 97530 THERAPEUTIC ACTIVITIES: CPT | Performed by: OCCUPATIONAL THERAPIST

## 2024-04-29 PROCEDURE — 97110 THERAPEUTIC EXERCISES: CPT | Performed by: OCCUPATIONAL THERAPIST

## 2024-04-29 NOTE — PROGRESS NOTES
"Occupational Therapy Daily Treatment Note  Royce OT: [address] [place] [address]      Patient: Nani Shultz   : 1970  Diagnosis/ICD-10 Code:  Left lateral epicondylitis [M77.12]  Referring practitioner: RODOLFO Lala  Date of Initial Visit: Type: THERAPY  Noted: 2024  Today's Date: 2024  Patient seen for 18 sessions             Subjective   Nani Shultz reports: \"It's doing better\" 5/10 pain L shoulder. No current pain L elbow.    Objective     See Exercise, Manual, and Modality Logs for complete treatment.       Assessment/Plan  Pt tolerated treatment well this date. Pt reports improved shoulder ROM and displays improved tolerance to shoulder stretching.  Progress per Plan of Care           Timed:  Manual Therapy:    23     mins  11139;  Therapeutic Exercise:    8     mins  30721;     Therapeutic Activity:     19     mins  29133;       Timed Treatment:   50   mins   Total Treatment:     50   mins        Billy Almaguer OT  Occupational Therapist  [place] License: [id number]  NPI: [id number]  "

## 2024-05-01 ENCOUNTER — TREATMENT (OUTPATIENT)
Dept: PHYSICAL THERAPY | Facility: CLINIC | Age: 54
End: 2024-05-01
Payer: COMMERCIAL

## 2024-05-01 DIAGNOSIS — M25.622 DECREASED ROM OF LEFT ELBOW: ICD-10-CM

## 2024-05-01 DIAGNOSIS — R20.2 TINGLING OF LEFT UPPER EXTREMITY: ICD-10-CM

## 2024-05-01 DIAGNOSIS — M77.12 LEFT LATERAL EPICONDYLITIS: Primary | ICD-10-CM

## 2024-05-01 DIAGNOSIS — M25.522 LEFT ELBOW PAIN: ICD-10-CM

## 2024-05-01 PROCEDURE — 97530 THERAPEUTIC ACTIVITIES: CPT | Performed by: OCCUPATIONAL THERAPIST

## 2024-05-01 PROCEDURE — 97110 THERAPEUTIC EXERCISES: CPT | Performed by: OCCUPATIONAL THERAPIST

## 2024-05-01 PROCEDURE — 97140 MANUAL THERAPY 1/> REGIONS: CPT | Performed by: OCCUPATIONAL THERAPIST

## 2024-05-01 NOTE — PROGRESS NOTES
OT Re-evaluation/Progress Note  Royce  OT: [address]  [place] [address]    Patient: Nani Shultz   : 1970  Diagnosis/ICD-10 Code:  Left lateral epicondylitis [M77.12]  Referring practitioner: RODOLFO Lala  Date of Initial Visit: Type: THERAPY  Noted: 2024  Today's Date: 2024  Patient seen for 19 sessions      Subjective:   Subjective Questionnaire: QuickDASH:   Clinical Progress: improved  Home Program Compliance: Yes  Treatment has included: therapeutic exercise, manual therapy, and therapeutic activity    Subjective Evaluation    Pain  Current pain rating: 3 (No current pain in elbow)  Location: L shoulder       Objective          Observations     Additional Elbow Observation Details  Well healed incisions to lateral elbow.    Neurological Testing     Additional Neurological Details  Pt reports no numbness/tingling.    Active Range of Motion   Left Shoulder   Flexion: 105 degrees   Abduction: 60 degrees   External rotation 0°: 25 degrees     Left Elbow   Flexion: 130 degrees   Extension: WFL  Forearm supination: WFL  Forearm pronation: WFL    Left Wrist   Wrist flexion: WFL  Wrist extension: WFL  Radial deviation: WFL  Ulnar deviation: WFL      Additional Active Range of Motion Details  IR to back hip    Passive Range of Motion   Left Shoulder   Flexion: 95 degrees   Abduction: 60 degrees   External rotation 45°: 25 degrees   Internal rotation 45°: 45 degrees     Strength/Myotome Testing     Left Elbow   Normal strength    Left Wrist/Hand   Normal wrist strength     (2nd hand position)     Trial 1: 51 lbs    Trial 2: 47 lbs    Trial 3: 54 lbs    Average: 50.67 lbs    Right Wrist/Hand      (2nd hand position)     Trial 1: 61 lbs    Trial 2: 60 lbs    Trial 3: 61 lbs    Average: 60.67 lbs      Assessment & Plan       Assessment  Impairments: abnormal or restricted ROM, activity intolerance, impaired physical strength, lacks appropriate home exercise program and pain  with function   Functional limitations: carrying objects, lifting, sleeping, pulling, pushing, uncomfortable because of pain, reaching behind back and reaching overhead   Assessment details: Pt displays improved A/PROM in shoulder and improved  strength in L hand. Pt continues with pain/stiffness in L shoulder that limits ability to complete ADLs/IADLs. Pt met 3/4 STGs and 3/4 LTGs. Pt to call back to schedule additional appts.  Prognosis: good    Goals  Plan Goals: Patient complains of pain in L elbow.  LTG 1  12 weeks:  Decrease L elbow pain to 1/10 to improve ADL status.  Status:  Met  STG 1  8 weeks:  Decrease L elbow pain to 2/10.  Status:  Met    2.  Patient displays decreased L elbow and forearm AROM.  LTG 2  12 weeks:  Increase L elbow AROM to 0/140 and forearm pro/sup to 90/90 degrees to improve ability to perform ADL's.  Status:  Not met  STG 2  8 weeks:  Increase L elbow AROM to -10/130 and forearm pro/sup to 60/60 degrees.  Status:  Partially met    3.  Patient displays decreased L arm and hand strength  LTG 3  12 weeks:  Increase L elbow strength to 5/5 MMT and  strength to 50 lbs to improve ability to reach, lift, carry and handle objects.  Status:  Met  STG 3  8 weeks:  Patient will display good tolerance to strength testing.  Status:  Met    4.  Carrying, moving, and handling objects functional limitation.  LTG 4  12 weeks:  The patient will demonstrate 1-19 % limitation by achieving a score of 19/55 on the Quick DASH.  Status: Met  STG 4  8 weeks:  The patient will demonstrate 40-59 % limitation by achieving a score of 36/55 on the Quick DASH.  Status:  Met       Plan  Planned modality interventions: cryotherapy and thermotherapy (hydrocollator packs)  Planned therapy interventions: body mechanics training, fine motor coordination training, flexibility, functional ROM exercises, home exercise program, joint mobilization, manual therapy, neuromuscular re-education, postural training, soft  tissue mobilization, strengthening, stretching and therapeutic activities  Frequency: 2x week  Duration in weeks: 12  Treatment plan discussed with: patient      Progress toward previous goals: Partially Met      Recommendations: Continue as planned  Timeframe: 1 month  Prognosis to achieve goals: good    OT SIGNATURE: Billy Almaguer OT   DATE TREATMENT INITIATED: Type: THERAPY  Noted: 2/7/2024  [place] License: [id number]    Certification Period: 5/1/2024 - 7/29/2024        Based upon review of the patient's progress and continued therapy plan, it is my medical opinion that Nani Shultz should continue occupational therapy treatment at [facility]  [address] [place] [id number] [phone].    Signature: __________________________________  Levar Verdugo APRN MD NPI: [id number]  Timed:  Manual Therapy:    23     mins  21904;  Therapeutic Exercise:    10     mins  46033;     Therapeutic Activity:     8     mins  28900;       Timed Treatment:   41   mins   Total Treatment:     41   mins  Please sign and return via fax to [phone]  . Thank you, [facility].

## 2024-07-10 ENCOUNTER — HOSPITAL ENCOUNTER (OUTPATIENT)
Dept: MAMMOGRAPHY | Facility: HOSPITAL | Age: 54
Discharge: HOME | End: 2024-07-10
Admitting: NURSE PRACTITIONER
Payer: COMMERCIAL

## 2024-07-10 DIAGNOSIS — Z12.31 SCREENING MAMMOGRAM FOR HIGH-RISK PATIENT: ICD-10-CM

## 2024-07-10 PROCEDURE — 77067 SCR MAMMO BI INCL CAD: CPT

## 2024-07-10 PROCEDURE — 77063 BREAST TOMOSYNTHESIS BI: CPT

## 2024-07-11 ENCOUNTER — DOCUMENTATION (OUTPATIENT)
Dept: PHYSICAL THERAPY | Facility: CLINIC | Age: 54
End: 2024-07-11
Payer: COMMERCIAL

## 2024-07-11 NOTE — PROGRESS NOTES
Discharge Summary  Discharge Summary from Occupational Therapy Report  Royce  OT: [address] [place] [address]    Dates  OT visit: 2/7/24 - 5/1/24  Number of Visits: 19     Discharge Status of Patient: See Progress Note dated 5/1/24    Goals: Partially Met    Discharge Plan: Continue with current home exercise program as instructed    Comments Pt did not return to therapy.    Date of Discharge 5/1/24        Billy Almaguer OT  Occupational Therapist  [place] License:[id number]

## 2024-07-12 ENCOUNTER — TRANSCRIBE ORDERS (OUTPATIENT)
Dept: ADMINISTRATIVE | Facility: HOSPITAL | Age: 54
End: 2024-07-12
Payer: COMMERCIAL

## 2024-07-15 ENCOUNTER — TRANSCRIBE ORDERS (OUTPATIENT)
Dept: ADMINISTRATIVE | Facility: HOSPITAL | Age: 54
End: 2024-07-15
Payer: COMMERCIAL

## 2024-07-15 DIAGNOSIS — R92.1 CALCIFICATION OF RIGHT BREAST ON MAMMOGRAPHY: Primary | ICD-10-CM

## 2024-08-08 ENCOUNTER — HOSPITAL ENCOUNTER (OUTPATIENT)
Dept: ULTRASOUND IMAGING | Facility: HOSPITAL | Age: 54
Discharge: HOME | End: 2024-08-08
Payer: COMMERCIAL

## 2024-08-08 ENCOUNTER — HOSPITAL ENCOUNTER (OUTPATIENT)
Dept: MAMMOGRAPHY | Facility: HOSPITAL | Age: 54
Discharge: HOME | End: 2024-08-08
Payer: COMMERCIAL

## 2024-08-08 DIAGNOSIS — R92.1 CALCIFICATION OF RIGHT BREAST ON MAMMOGRAPHY: ICD-10-CM

## 2024-08-08 PROCEDURE — G0279 TOMOSYNTHESIS, MAMMO: HCPCS

## 2024-08-08 PROCEDURE — 76642 ULTRASOUND BREAST LIMITED: CPT

## 2024-08-08 PROCEDURE — 77065 DX MAMMO INCL CAD UNI: CPT

## 2024-09-18 ENCOUNTER — TELEMEDICINE (OUTPATIENT)
Dept: FAMILY MEDICINE CLINIC | Facility: TELEHEALTH | Age: 54
End: 2024-09-18
Payer: COMMERCIAL

## 2024-09-18 DIAGNOSIS — B02.9 HERPES ZOSTER WITHOUT COMPLICATION: Primary | ICD-10-CM

## 2024-09-18 RX ORDER — NAPROXEN 500 MG/1
1 TABLET ORAL 2 TIMES DAILY WITH MEALS
COMMUNITY

## 2024-09-18 RX ORDER — HYDROCODONE BITARTRATE AND ACETAMINOPHEN 5; 325 MG/1; MG/1
TABLET ORAL AS NEEDED
COMMUNITY

## 2024-09-18 RX ORDER — VALACYCLOVIR HYDROCHLORIDE 1 G/1
1000 TABLET, FILM COATED ORAL 3 TIMES DAILY
Qty: 21 TABLET | Refills: 0 | Status: SHIPPED | OUTPATIENT
Start: 2024-09-18 | End: 2024-09-25

## 2024-09-18 RX ORDER — ROSUVASTATIN CALCIUM 10 MG/1
10 TABLET, COATED ORAL DAILY
COMMUNITY

## 2024-09-18 RX ORDER — AMANTADINE HYDROCHLORIDE 100 MG/1
TABLET ORAL
COMMUNITY

## 2024-09-18 RX ORDER — MONTELUKAST SODIUM 10 MG/1
10 TABLET ORAL DAILY
COMMUNITY

## 2024-09-18 RX ORDER — IPRATROPIUM BROMIDE 42 UG/1
SPRAY, METERED NASAL
COMMUNITY

## 2024-09-18 RX ORDER — AZELASTINE 1 MG/ML
SPRAY, METERED NASAL
COMMUNITY
Start: 2024-08-22

## 2024-09-18 RX ORDER — TRAVOPROST OPHTHALMIC SOLUTION 0.04 MG/ML
SOLUTION OPHTHALMIC
COMMUNITY

## 2024-09-18 RX ORDER — CHLORHEXIDINE GLUCONATE ORAL RINSE 1.2 MG/ML
SOLUTION DENTAL
COMMUNITY

## 2024-09-18 RX ORDER — IBUPROFEN 600 MG/1
1 TABLET, FILM COATED ORAL EVERY 6 HOURS PRN
COMMUNITY

## 2024-09-18 RX ORDER — CELECOXIB 100 MG/1
1 CAPSULE ORAL 2 TIMES DAILY
COMMUNITY